# Patient Record
Sex: MALE | Race: OTHER | HISPANIC OR LATINO | Employment: OTHER | URBAN - METROPOLITAN AREA
[De-identification: names, ages, dates, MRNs, and addresses within clinical notes are randomized per-mention and may not be internally consistent; named-entity substitution may affect disease eponyms.]

---

## 2017-01-09 ENCOUNTER — GENERIC CONVERSION - ENCOUNTER (OUTPATIENT)
Dept: OTHER | Facility: OTHER | Age: 47
End: 2017-01-09

## 2017-01-30 ENCOUNTER — TRANSCRIBE ORDERS (OUTPATIENT)
Dept: ADMINISTRATIVE | Facility: HOSPITAL | Age: 47
End: 2017-01-30

## 2017-01-30 DIAGNOSIS — D49.6 CAVERNOUS SINUS TUMOR (HCC): Primary | ICD-10-CM

## 2017-02-03 ENCOUNTER — HOSPITAL ENCOUNTER (OUTPATIENT)
Dept: RADIOLOGY | Age: 47
Discharge: HOME | End: 2017-02-03
Payer: COMMERCIAL

## 2017-02-03 DIAGNOSIS — D49.6 CAVERNOUS SINUS TUMOR (HCC): ICD-10-CM

## 2017-02-03 PROCEDURE — 70486 CT MAXILLOFACIAL W/O DYE: CPT

## 2017-02-22 ENCOUNTER — OFFICE VISIT (OUTPATIENT)
Dept: URGENT CARE | Age: 47
End: 2017-02-22
Payer: COMMERCIAL

## 2017-02-22 PROCEDURE — 87430 STREP A AG IA: CPT | Performed by: FAMILY MEDICINE

## 2017-02-22 PROCEDURE — 99283 EMERGENCY DEPT VISIT LOW MDM: CPT | Performed by: FAMILY MEDICINE

## 2017-02-22 PROCEDURE — G0382 LEV 3 HOSP TYPE B ED VISIT: HCPCS | Performed by: FAMILY MEDICINE

## 2017-03-06 ENCOUNTER — ANESTHESIA EVENT (OUTPATIENT)
Dept: GASTROENTEROLOGY | Facility: HOSPITAL | Age: 47
End: 2017-03-06
Payer: COMMERCIAL

## 2017-03-07 ENCOUNTER — HOSPITAL ENCOUNTER (OUTPATIENT)
Facility: HOSPITAL | Age: 47
Setting detail: OUTPATIENT SURGERY
Discharge: HOME | End: 2017-03-07
Attending: COLON & RECTAL SURGERY | Admitting: COLON & RECTAL SURGERY
Payer: COMMERCIAL

## 2017-03-07 ENCOUNTER — ANESTHESIA (OUTPATIENT)
Dept: GASTROENTEROLOGY | Facility: HOSPITAL | Age: 47
End: 2017-03-07
Payer: COMMERCIAL

## 2017-03-07 VITALS
RESPIRATION RATE: 16 BRPM | SYSTOLIC BLOOD PRESSURE: 114 MMHG | OXYGEN SATURATION: 95 % | HEART RATE: 82 BPM | BODY MASS INDEX: 29.55 KG/M2 | HEIGHT: 68 IN | TEMPERATURE: 98.1 F | WEIGHT: 195 LBS | DIASTOLIC BLOOD PRESSURE: 72 MMHG

## 2017-03-07 DIAGNOSIS — K62.5 RECTAL BLEEDING: ICD-10-CM

## 2017-03-07 PROCEDURE — 88305 TISSUE EXAM BY PATHOLOGIST: CPT | Performed by: COLON & RECTAL SURGERY

## 2017-03-07 RX ORDER — PROPOFOL 10 MG/ML
INJECTION, EMULSION INTRAVENOUS AS NEEDED
Status: DISCONTINUED | OUTPATIENT
Start: 2017-03-07 | End: 2017-03-07 | Stop reason: SURG

## 2017-03-07 RX ORDER — SODIUM CHLORIDE, SODIUM LACTATE, POTASSIUM CHLORIDE, CALCIUM CHLORIDE 600; 310; 30; 20 MG/100ML; MG/100ML; MG/100ML; MG/100ML
50 INJECTION, SOLUTION INTRAVENOUS CONTINUOUS
Status: DISCONTINUED | OUTPATIENT
Start: 2017-03-07 | End: 2017-03-07 | Stop reason: HOSPADM

## 2017-03-07 RX ORDER — PROPOFOL 10 MG/ML
INJECTION, EMULSION INTRAVENOUS CONTINUOUS PRN
Status: DISCONTINUED | OUTPATIENT
Start: 2017-03-07 | End: 2017-03-07 | Stop reason: SURG

## 2017-03-07 RX ORDER — LIDOCAINE HYDROCHLORIDE 10 MG/ML
INJECTION, SOLUTION INFILTRATION; PERINEURAL AS NEEDED
Status: DISCONTINUED | OUTPATIENT
Start: 2017-03-07 | End: 2017-03-07 | Stop reason: SURG

## 2017-03-07 RX ORDER — SODIUM CHLORIDE 9 MG/ML
INJECTION, SOLUTION INTRAVENOUS CONTINUOUS PRN
Status: DISCONTINUED | OUTPATIENT
Start: 2017-03-07 | End: 2017-03-07 | Stop reason: SURG

## 2017-03-07 RX ADMIN — SODIUM CHLORIDE, SODIUM LACTATE, POTASSIUM CHLORIDE, AND CALCIUM CHLORIDE 50 ML/HR: .6; .31; .03; .02 INJECTION, SOLUTION INTRAVENOUS at 09:43

## 2017-03-07 RX ADMIN — LIDOCAINE HYDROCHLORIDE 50 MG: 10 INJECTION, SOLUTION INFILTRATION; PERINEURAL at 09:50

## 2017-03-07 RX ADMIN — PROPOFOL 150 MG: 10 INJECTION, EMULSION INTRAVENOUS at 09:50

## 2017-03-07 RX ADMIN — SODIUM CHLORIDE: 0.9 INJECTION, SOLUTION INTRAVENOUS at 09:35

## 2017-03-07 RX ADMIN — PROPOFOL 120 MCG/KG/MIN: 10 INJECTION, EMULSION INTRAVENOUS at 09:51

## 2017-04-24 ENCOUNTER — ALLSCRIPTS OFFICE VISIT (OUTPATIENT)
Dept: OTHER | Facility: OTHER | Age: 47
End: 2017-04-24

## 2017-04-24 DIAGNOSIS — C76.0 MALIGNANT NEOPLASM OF HEAD, FACE AND NECK (HCC): ICD-10-CM

## 2017-04-24 DIAGNOSIS — R63.5 ABNORMAL WEIGHT GAIN: ICD-10-CM

## 2017-04-24 DIAGNOSIS — Z13.220 ENCOUNTER FOR SCREENING FOR LIPOID DISORDERS: ICD-10-CM

## 2017-04-26 ENCOUNTER — TRANSCRIBE ORDERS (OUTPATIENT)
Dept: LAB | Facility: HOSPITAL | Age: 47
End: 2017-04-26

## 2017-04-26 ENCOUNTER — APPOINTMENT (OUTPATIENT)
Dept: LAB | Facility: HOSPITAL | Age: 47
End: 2017-04-26
Attending: OTOLARYNGOLOGY
Payer: COMMERCIAL

## 2017-04-26 DIAGNOSIS — J34.89 ATROPHY OF NASAL TURBINATES: ICD-10-CM

## 2017-04-26 DIAGNOSIS — J32.2 CHRONIC ETHMOIDAL SINUSITIS: Primary | ICD-10-CM

## 2017-04-26 DIAGNOSIS — J34.2 DEVIATED NASAL SEPTUM: ICD-10-CM

## 2017-04-26 DIAGNOSIS — J32.2 CHRONIC ETHMOIDAL SINUSITIS: ICD-10-CM

## 2017-04-26 LAB
ANION GAP SERPL CALCULATED.3IONS-SCNC: 7 MMOL/L (ref 4–13)
BASOPHILS # BLD AUTO: 0.04 THOUSANDS/ΜL (ref 0–0.1)
BASOPHILS NFR BLD AUTO: 0 % (ref 0–1)
BUN SERPL-MCNC: 15 MG/DL (ref 5–25)
CALCIUM SERPL-MCNC: 9 MG/DL (ref 8.3–10.1)
CHLORIDE SERPL-SCNC: 103 MMOL/L (ref 100–108)
CO2 SERPL-SCNC: 29 MMOL/L (ref 21–32)
CREAT SERPL-MCNC: 0.93 MG/DL (ref 0.6–1.3)
EOSINOPHIL # BLD AUTO: 0.07 THOUSAND/ΜL (ref 0–0.61)
EOSINOPHIL NFR BLD AUTO: 1 % (ref 0–6)
ERYTHROCYTE [DISTWIDTH] IN BLOOD BY AUTOMATED COUNT: 12.8 % (ref 11.6–15.1)
GFR SERPL CREATININE-BSD FRML MDRD: >60 ML/MIN/1.73SQ M
GLUCOSE SERPL-MCNC: 127 MG/DL (ref 65–140)
HCT VFR BLD AUTO: 45.8 % (ref 36.5–49.3)
HGB BLD-MCNC: 15.5 G/DL (ref 12–17)
LYMPHOCYTES # BLD AUTO: 1.48 THOUSANDS/ΜL (ref 0.6–4.47)
LYMPHOCYTES NFR BLD AUTO: 14 % (ref 14–44)
MCH RBC QN AUTO: 31.3 PG (ref 26.8–34.3)
MCHC RBC AUTO-ENTMCNC: 33.8 G/DL (ref 31.4–37.4)
MCV RBC AUTO: 92 FL (ref 82–98)
MONOCYTES # BLD AUTO: 0.7 THOUSAND/ΜL (ref 0.17–1.22)
MONOCYTES NFR BLD AUTO: 6 % (ref 4–12)
NEUTROPHILS # BLD AUTO: 8.6 THOUSANDS/ΜL (ref 1.85–7.62)
NEUTS SEG NFR BLD AUTO: 79 % (ref 43–75)
NRBC BLD AUTO-RTO: 0 /100 WBCS
PLATELET # BLD AUTO: 203 THOUSANDS/UL (ref 149–390)
PMV BLD AUTO: 12.4 FL (ref 8.9–12.7)
POTASSIUM SERPL-SCNC: 3.6 MMOL/L (ref 3.5–5.3)
RBC # BLD AUTO: 4.96 MILLION/UL (ref 3.88–5.62)
SODIUM SERPL-SCNC: 139 MMOL/L (ref 136–145)
WBC # BLD AUTO: 10.91 THOUSAND/UL (ref 4.31–10.16)

## 2017-04-26 PROCEDURE — 85025 COMPLETE CBC W/AUTO DIFF WBC: CPT

## 2017-04-26 PROCEDURE — 80048 BASIC METABOLIC PNL TOTAL CA: CPT

## 2017-04-26 PROCEDURE — 36415 COLL VENOUS BLD VENIPUNCTURE: CPT

## 2017-04-27 ENCOUNTER — ANESTHESIA EVENT (OUTPATIENT)
Dept: PERIOP | Facility: HOSPITAL | Age: 47
End: 2017-04-27
Payer: COMMERCIAL

## 2017-04-28 ENCOUNTER — HOSPITAL ENCOUNTER (OUTPATIENT)
Facility: HOSPITAL | Age: 47
Setting detail: OUTPATIENT SURGERY
Discharge: HOME | End: 2017-04-28
Attending: OTOLARYNGOLOGY | Admitting: OTOLARYNGOLOGY
Payer: COMMERCIAL

## 2017-04-28 ENCOUNTER — ANESTHESIA (OUTPATIENT)
Dept: PERIOP | Facility: HOSPITAL | Age: 47
End: 2017-04-28
Payer: COMMERCIAL

## 2017-04-28 VITALS
TEMPERATURE: 98.5 F | HEIGHT: 68 IN | WEIGHT: 203 LBS | OXYGEN SATURATION: 94 % | RESPIRATION RATE: 16 BRPM | BODY MASS INDEX: 30.77 KG/M2 | DIASTOLIC BLOOD PRESSURE: 72 MMHG | SYSTOLIC BLOOD PRESSURE: 124 MMHG | HEART RATE: 91 BPM

## 2017-04-28 DIAGNOSIS — J34.89 NASAL OBSTRUCTION: ICD-10-CM

## 2017-04-28 DIAGNOSIS — J32.9 CHRONIC SINUSITIS: ICD-10-CM

## 2017-04-28 DIAGNOSIS — R09.81 NASAL CONGESTION: ICD-10-CM

## 2017-04-28 DIAGNOSIS — J34.2 DEVIATED SEPTUM: ICD-10-CM

## 2017-04-28 DIAGNOSIS — J34.89 NASAL VALVE COLLAPSE: ICD-10-CM

## 2017-04-28 DIAGNOSIS — J34.3 NASAL TURBINATE HYPERTROPHY: ICD-10-CM

## 2017-04-28 PROCEDURE — C1726 CATH, BAL DIL, NON-VASCULAR: HCPCS | Performed by: OTOLARYNGOLOGY

## 2017-04-28 PROCEDURE — 88311 DECALCIFY TISSUE: CPT | Performed by: OTOLARYNGOLOGY

## 2017-04-28 PROCEDURE — C2625 STENT, NON-COR, TEM W/DEL SY: HCPCS | Performed by: OTOLARYNGOLOGY

## 2017-04-28 PROCEDURE — 88305 TISSUE EXAM BY PATHOLOGIST: CPT | Performed by: OTOLARYNGOLOGY

## 2017-04-28 DEVICE — PROPEL MINI SINUS IMPLANT
Type: IMPLANTABLE DEVICE | Site: NOSE | Status: FUNCTIONAL
Brand: PROPEL MINI

## 2017-04-28 RX ORDER — FENTANYL CITRATE 50 UG/ML
INJECTION, SOLUTION INTRAMUSCULAR; INTRAVENOUS AS NEEDED
Status: DISCONTINUED | OUTPATIENT
Start: 2017-04-28 | End: 2017-04-28 | Stop reason: SURG

## 2017-04-28 RX ORDER — ONDANSETRON 2 MG/ML
4 INJECTION INTRAMUSCULAR; INTRAVENOUS ONCE AS NEEDED
Status: DISCONTINUED | OUTPATIENT
Start: 2017-04-28 | End: 2017-04-28 | Stop reason: HOSPADM

## 2017-04-28 RX ORDER — SODIUM CHLORIDE, SODIUM LACTATE, POTASSIUM CHLORIDE, CALCIUM CHLORIDE 600; 310; 30; 20 MG/100ML; MG/100ML; MG/100ML; MG/100ML
125 INJECTION, SOLUTION INTRAVENOUS CONTINUOUS
Status: DISCONTINUED | OUTPATIENT
Start: 2017-04-28 | End: 2017-04-28 | Stop reason: HOSPADM

## 2017-04-28 RX ORDER — LIDOCAINE HYDROCHLORIDE AND EPINEPHRINE 10; 10 MG/ML; UG/ML
INJECTION, SOLUTION INFILTRATION; PERINEURAL AS NEEDED
Status: DISCONTINUED | OUTPATIENT
Start: 2017-04-28 | End: 2017-04-28 | Stop reason: HOSPADM

## 2017-04-28 RX ORDER — PROPOFOL 10 MG/ML
INJECTION, EMULSION INTRAVENOUS AS NEEDED
Status: DISCONTINUED | OUTPATIENT
Start: 2017-04-28 | End: 2017-04-28 | Stop reason: SURG

## 2017-04-28 RX ORDER — GLYCOPYRROLATE 0.2 MG/ML
INJECTION INTRAMUSCULAR; INTRAVENOUS AS NEEDED
Status: DISCONTINUED | OUTPATIENT
Start: 2017-04-28 | End: 2017-04-28 | Stop reason: SURG

## 2017-04-28 RX ORDER — LIDOCAINE HYDROCHLORIDE 40 MG/ML
SOLUTION TOPICAL AS NEEDED
Status: DISCONTINUED | OUTPATIENT
Start: 2017-04-28 | End: 2017-04-28 | Stop reason: SURG

## 2017-04-28 RX ORDER — LIDOCAINE HYDROCHLORIDE 10 MG/ML
INJECTION, SOLUTION INFILTRATION; PERINEURAL AS NEEDED
Status: DISCONTINUED | OUTPATIENT
Start: 2017-04-28 | End: 2017-04-28 | Stop reason: SURG

## 2017-04-28 RX ORDER — SODIUM CHLORIDE, SODIUM LACTATE, POTASSIUM CHLORIDE, CALCIUM CHLORIDE 600; 310; 30; 20 MG/100ML; MG/100ML; MG/100ML; MG/100ML
20 INJECTION, SOLUTION INTRAVENOUS CONTINUOUS
Status: DISCONTINUED | OUTPATIENT
Start: 2017-04-28 | End: 2017-04-28 | Stop reason: HOSPADM

## 2017-04-28 RX ORDER — OXYCODONE HYDROCHLORIDE AND ACETAMINOPHEN 5; 325 MG/1; MG/1
1 TABLET ORAL EVERY 4 HOURS PRN
Qty: 28 TABLET | Refills: 0 | Status: SHIPPED | OUTPATIENT
Start: 2017-04-28 | End: 2017-05-08

## 2017-04-28 RX ORDER — ONDANSETRON 2 MG/ML
INJECTION INTRAMUSCULAR; INTRAVENOUS AS NEEDED
Status: DISCONTINUED | OUTPATIENT
Start: 2017-04-28 | End: 2017-04-28 | Stop reason: SURG

## 2017-04-28 RX ORDER — CEPHALEXIN 500 MG/1
500 CAPSULE ORAL 4 TIMES DAILY
Qty: 40 CAPSULE | Refills: 0 | Status: SHIPPED | OUTPATIENT
Start: 2017-04-28 | End: 2017-05-08

## 2017-04-28 RX ORDER — MIDAZOLAM HYDROCHLORIDE 1 MG/ML
INJECTION INTRAMUSCULAR; INTRAVENOUS AS NEEDED
Status: DISCONTINUED | OUTPATIENT
Start: 2017-04-28 | End: 2017-04-28 | Stop reason: SURG

## 2017-04-28 RX ORDER — OXYCODONE HYDROCHLORIDE AND ACETAMINOPHEN 5; 325 MG/1; MG/1
2 TABLET ORAL EVERY 4 HOURS PRN
Status: DISCONTINUED | OUTPATIENT
Start: 2017-04-28 | End: 2017-04-28 | Stop reason: HOSPADM

## 2017-04-28 RX ORDER — FENTANYL CITRATE/PF 50 MCG/ML
25 SYRINGE (ML) INJECTION
Status: COMPLETED | OUTPATIENT
Start: 2017-04-28 | End: 2017-04-28

## 2017-04-28 RX ORDER — ROCURONIUM BROMIDE 10 MG/ML
INJECTION, SOLUTION INTRAVENOUS AS NEEDED
Status: DISCONTINUED | OUTPATIENT
Start: 2017-04-28 | End: 2017-04-28 | Stop reason: SURG

## 2017-04-28 RX ORDER — ONDANSETRON 2 MG/ML
4 INJECTION INTRAMUSCULAR; INTRAVENOUS EVERY 6 HOURS PRN
Status: DISCONTINUED | OUTPATIENT
Start: 2017-04-28 | End: 2017-04-28 | Stop reason: HOSPADM

## 2017-04-28 RX ADMIN — HYDROMORPHONE HYDROCHLORIDE 0.5 MG: 1 INJECTION, SOLUTION INTRAMUSCULAR; INTRAVENOUS; SUBCUTANEOUS at 13:36

## 2017-04-28 RX ADMIN — FENTANYL CITRATE 25 MCG: 50 INJECTION INTRAMUSCULAR; INTRAVENOUS at 13:07

## 2017-04-28 RX ADMIN — LIDOCAINE HYDROCHLORIDE 50 MG: 10 INJECTION, SOLUTION INFILTRATION; PERINEURAL at 11:10

## 2017-04-28 RX ADMIN — SODIUM CHLORIDE, SODIUM LACTATE, POTASSIUM CHLORIDE, AND CALCIUM CHLORIDE: .6; .31; .03; .02 INJECTION, SOLUTION INTRAVENOUS at 10:45

## 2017-04-28 RX ADMIN — ONDANSETRON 4 MG: 2 INJECTION INTRAMUSCULAR; INTRAVENOUS at 12:15

## 2017-04-28 RX ADMIN — FENTANYL CITRATE 25 MCG: 50 INJECTION INTRAMUSCULAR; INTRAVENOUS at 13:03

## 2017-04-28 RX ADMIN — FENTANYL CITRATE 50 MCG: 50 INJECTION, SOLUTION INTRAMUSCULAR; INTRAVENOUS at 12:15

## 2017-04-28 RX ADMIN — NEOSTIGMINE METHYLSULFATE 2.5 MG: 1 INJECTION INTRAMUSCULAR; INTRAVENOUS; SUBCUTANEOUS at 12:35

## 2017-04-28 RX ADMIN — FENTANYL CITRATE 50 MCG: 50 INJECTION, SOLUTION INTRAMUSCULAR; INTRAVENOUS at 11:10

## 2017-04-28 RX ADMIN — Medication 2000 MG: at 11:10

## 2017-04-28 RX ADMIN — MIDAZOLAM HYDROCHLORIDE 2 MG: 1 INJECTION, SOLUTION INTRAMUSCULAR; INTRAVENOUS at 10:55

## 2017-04-28 RX ADMIN — FENTANYL CITRATE 25 MCG: 50 INJECTION INTRAMUSCULAR; INTRAVENOUS at 13:11

## 2017-04-28 RX ADMIN — GLYCOPYRROLATE 0.4 MG: 0.2 INJECTION INTRAMUSCULAR; INTRAVENOUS at 12:35

## 2017-04-28 RX ADMIN — PROPOFOL 200 MG: 10 INJECTION, EMULSION INTRAVENOUS at 11:10

## 2017-04-28 RX ADMIN — HYDROMORPHONE HYDROCHLORIDE 0.5 MG: 1 INJECTION, SOLUTION INTRAMUSCULAR; INTRAVENOUS; SUBCUTANEOUS at 13:26

## 2017-04-28 RX ADMIN — ROCURONIUM BROMIDE 40 MG: 10 INJECTION, SOLUTION INTRAVENOUS at 11:10

## 2017-04-28 RX ADMIN — SODIUM CHLORIDE, SODIUM LACTATE, POTASSIUM CHLORIDE, AND CALCIUM CHLORIDE 125 ML/HR: .6; .31; .03; .02 INJECTION, SOLUTION INTRAVENOUS at 10:02

## 2017-04-28 RX ADMIN — LIDOCAINE HYDROCHLORIDE 1 APPLICATION: 40 SOLUTION TOPICAL at 11:13

## 2017-04-28 RX ADMIN — FENTANYL CITRATE 25 MCG: 50 INJECTION INTRAMUSCULAR; INTRAVENOUS at 13:13

## 2017-04-28 RX ADMIN — SODIUM CHLORIDE, SODIUM LACTATE, POTASSIUM CHLORIDE, AND CALCIUM CHLORIDE 125 ML/HR: .6; .31; .03; .02 INJECTION, SOLUTION INTRAVENOUS at 13:51

## 2017-05-05 ENCOUNTER — TRANSCRIBE ORDERS (OUTPATIENT)
Dept: ADMINISTRATIVE | Facility: HOSPITAL | Age: 47
End: 2017-05-05

## 2017-05-05 DIAGNOSIS — C76.0 MALIGNANT NEOPLASM OF HEAD, FACE, AND NECK (HCC): Primary | ICD-10-CM

## 2017-05-18 ENCOUNTER — APPOINTMENT (OUTPATIENT)
Dept: LAB | Facility: HOSPITAL | Age: 47
End: 2017-05-18
Attending: FAMILY MEDICINE
Payer: COMMERCIAL

## 2017-05-18 ENCOUNTER — TRANSCRIBE ORDERS (OUTPATIENT)
Dept: LAB | Facility: HOSPITAL | Age: 47
End: 2017-05-18

## 2017-05-18 DIAGNOSIS — R63.5 ABNORMAL WEIGHT GAIN: ICD-10-CM

## 2017-05-18 LAB
ALBUMIN SERPL BCP-MCNC: 3.8 G/DL (ref 3.5–5)
ALP SERPL-CCNC: 75 U/L (ref 46–116)
ALT SERPL W P-5'-P-CCNC: 46 U/L (ref 12–78)
ANION GAP SERPL CALCULATED.3IONS-SCNC: 6 MMOL/L (ref 4–13)
AST SERPL W P-5'-P-CCNC: 17 U/L (ref 5–45)
BILIRUB SERPL-MCNC: 0.86 MG/DL (ref 0.2–1)
BUN SERPL-MCNC: 17 MG/DL (ref 5–25)
CALCIUM SERPL-MCNC: 9.3 MG/DL (ref 8.3–10.1)
CHLORIDE SERPL-SCNC: 104 MMOL/L (ref 100–108)
CO2 SERPL-SCNC: 30 MMOL/L (ref 21–32)
CREAT SERPL-MCNC: 0.84 MG/DL (ref 0.6–1.3)
GFR SERPL CREATININE-BSD FRML MDRD: >60 ML/MIN/1.73SQ M
GLUCOSE SERPL-MCNC: 77 MG/DL (ref 65–140)
POTASSIUM SERPL-SCNC: 4 MMOL/L (ref 3.5–5.3)
PROT SERPL-MCNC: 7.9 G/DL (ref 6.4–8.2)
SODIUM SERPL-SCNC: 140 MMOL/L (ref 136–145)
TSH SERPL DL<=0.05 MIU/L-ACNC: 0.99 UIU/ML (ref 0.36–3.74)

## 2017-05-18 PROCEDURE — 84443 ASSAY THYROID STIM HORMONE: CPT

## 2017-05-18 PROCEDURE — 36415 COLL VENOUS BLD VENIPUNCTURE: CPT

## 2017-05-18 PROCEDURE — 80053 COMPREHEN METABOLIC PANEL: CPT

## 2017-05-22 ENCOUNTER — ALLSCRIPTS OFFICE VISIT (OUTPATIENT)
Dept: OTHER | Facility: OTHER | Age: 47
End: 2017-05-22

## 2017-07-24 ENCOUNTER — GENERIC CONVERSION - ENCOUNTER (OUTPATIENT)
Dept: OTHER | Facility: OTHER | Age: 47
End: 2017-07-24

## 2017-10-19 ENCOUNTER — ALLSCRIPTS OFFICE VISIT (OUTPATIENT)
Dept: OTHER | Facility: OTHER | Age: 47
End: 2017-10-19

## 2017-10-19 ENCOUNTER — TRANSCRIBE ORDERS (OUTPATIENT)
Dept: ADMINISTRATIVE | Facility: HOSPITAL | Age: 47
End: 2017-10-19

## 2017-10-19 DIAGNOSIS — C76.0 MALIGNANT NEOPLASM OF HEAD, FACE, AND NECK (HCC): Primary | ICD-10-CM

## 2017-10-20 NOTE — PROGRESS NOTES
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; Ordered By:Ally Richardson;    Discussion/Summary  Discussion Summary:   In summary, this is a 61-year-old male history of adenoid cystic carcinoma as outlined  he is doing fairly well  He does have some small volume nose bleeding from the right nostril on an almost daily basis  Saline nasal spray was recommended  he wakes up at night 4 times a night or so to clear his nasal passages  As a result he has daytime fatigue  I suggested that he confer with ENT regarding anything to minimize nighttime waking  This would probably translate into better daytime energy  his wife asked about whether his chemotherapy administration at the time that she became pregnant with their 3rd child could have any impact on him, specifically regarding the possibility of autism   We reviewed that this is difficult to prove but certainly is a consideration  made arrangements for repeat imaging just prior to his next visit in 4 months time  patient and his wife voiced understanding above discussion  Counseling Documentation With Imm: The patient was counseled regarding diagnostic results,-- instructions for management,-- patient and family education,-- impressions  total time of encounter was 25 minutes  Chief Complaint  Chief Complaint Free Text Note Form: Follow-up regarding adenoid cystic carcinoma  History of Present Illness  HPI: July 2015- 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, status post resection  This was followed by radiation with high-dose cisplatin  No postradiation chemotherapy was administered  He had good clinical/radiographic response  2016- He underwent debulking surgery  No recurrent disease was noted  Interval History: BL ankle swelling  tired and down  Review of Systems  Complete-Male:   Constitutional: No fever or chills, feels well, no tiredness, no recent weight gain or weight loss  Eyes: No complaints of eye pain, no red eyes, no discharge from eyes, no itchy eyes  The patient presents with complaints of episodes of bilateral nostril nosebleeds Episodes have been occurring 1 time a day starting about 2-3 months ago (mostly in the morning  )  Cardiovascular: No complaints of slow heart rate, no fast heart rate, no chest pain, no palpitations, no leg claudication, no lower extremity  Respiratory: No complaints of shortness of breath, no wheezing, no cough, no SOB on exertion, no orthopnea or PND  Gastrointestinal: No complaints of abdominal pain, no constipation, no nausea or vomiting, no diarrhea or bloody stools  Genitourinary: No complaints of dysuria, no incontinence, no hesitancy, no nocturia, no genital lesion, no testicular pain  Musculoskeletal: No complaints of arthralgia, no myalgias, no joint swelling or stiffness, no limb pain or swelling     Integumentary: migratory rash over past few months  The patient presents with complaints of intermittent episodes of dizziness (associated with headache  )  Psychiatric: Is not suicidal, no sleep disturbances, no anxiety or depression, no change in personality, no emotional problems  Endocrine: No complaints of proptosis, no hot flashes, no muscle weakness, no erectile dysfunction, no deepening of the voice, no feelings of weakness  Hematologic/Lymphatic: No complaints of swollen glands, no swollen glands in the neck, does not bleed easily, no easy bruising  Active Problems  1  Abnormal weight gain (783 1) (R63 5)   2  Acute streptococcal pharyngitis (034 0) (J02 0)   3  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)   4  Ear pain, right (388 70) (H92 01)   5  Hematochezia (578 1) (K92 1)   6  Screening for lipoid disorders (V77 91) (Z13 220)   7  Sore throat (462) (J02 9)    Family History  Father    1  Family history of lung cancer (V16 1) (Z80 1)   2  Family history of malignant neoplasm of breast (V16 3) (Z80 3)  Paternal Aunt    3  Family history of malignant neoplasm of breast (V16 3) (Z80 3)    Social History   · Never a smoker   · Social alcohol use (Z78 9)    Current Meds   1  Deep Sea Nasal [place] SOLN;   Therapy: (Recorded:[id number]) to Recorded    Allergies  1  Clindamycin    Vitals  Vital Signs    Recorded: [id number] 12:14PM   Temperature 97 8 F   Heart Rate 84   Respiration 15   Systolic 199   Diastolic 84   Height 5 ft 5 in   Weight 206 lb    BMI Calculated 34 28   BSA Calculated 2   Pain Scale 0     Physical Exam    Constitutional   General appearance: No acute distress, well appearing and well nourished  Eyes   Conjunctiva and lids: No swelling, erythema, or discharge  Pupils and irises: Equal, round and reactive to light  Ears, Nose, Mouth, and Throat   External inspection of ears and nose: Normal     Oropharynx: Normal with no erythema, edema, exudate or lesions      Pulmonary   Respiratory effort: No increased work of breathing or signs of respiratory distress  Auscultation of lungs: Clear to auscultation, equal breath sounds bilaterally, no wheezes, no rales, no rhonci  Cardiovascular   Auscultation of heart: Normal rate and rhythm, normal S1 and S2, without murmurs  Examination of extremities for edema and/or varicosities: Normal     Abdomen   Abdomen: Non-tender, no masses  Liver and spleen: No hepatomegaly or splenomegaly  Lymphatic   Palpation of lymph nodes in neck: No lymphadenopathy  Musculoskeletal   Gait and station: Normal     Skin   Skin and subcutaneous tissue: Normal without rashes or lesions  Neurologic   Cranial nerves: Cranial nerves 2-12 intact  Psychiatric   Orientation to person, place and time: Normal          Signatures   Electronically signed by : RAYMUNDO Toussaint  Oct 19 2017 12:51PM EST                       (Author)

## 2017-11-15 ENCOUNTER — APPOINTMENT (EMERGENCY)
Dept: RADIOLOGY | Facility: HOSPITAL | Age: 47
End: 2017-11-15
Payer: COMMERCIAL

## 2017-11-15 ENCOUNTER — HOSPITAL ENCOUNTER (EMERGENCY)
Facility: HOSPITAL | Age: 47
Discharge: HOME | End: 2017-11-15
Attending: EMERGENCY MEDICINE | Admitting: EMERGENCY MEDICINE
Payer: COMMERCIAL

## 2017-11-15 VITALS
RESPIRATION RATE: 20 BRPM | HEIGHT: 67 IN | TEMPERATURE: 96.9 F | OXYGEN SATURATION: 98 % | DIASTOLIC BLOOD PRESSURE: 95 MMHG | HEART RATE: 81 BPM | WEIGHT: 195 LBS | SYSTOLIC BLOOD PRESSURE: 136 MMHG | BODY MASS INDEX: 30.61 KG/M2

## 2017-11-15 DIAGNOSIS — J06.9 UPPER RESPIRATORY INFECTION: Primary | ICD-10-CM

## 2017-11-15 LAB
ALBUMIN SERPL BCP-MCNC: 3.6 G/DL (ref 3.5–5)
ALP SERPL-CCNC: 77 U/L (ref 46–116)
ALT SERPL W P-5'-P-CCNC: 47 U/L (ref 12–78)
ANION GAP SERPL CALCULATED.3IONS-SCNC: 7 MMOL/L (ref 4–13)
AST SERPL W P-5'-P-CCNC: 21 U/L (ref 5–45)
BASOPHILS # BLD AUTO: 0.04 THOUSANDS/ΜL (ref 0–0.1)
BASOPHILS NFR BLD AUTO: 1 % (ref 0–1)
BILIRUB SERPL-MCNC: 0.69 MG/DL (ref 0.2–1)
BUN SERPL-MCNC: 15 MG/DL (ref 5–25)
CALCIUM SERPL-MCNC: 9 MG/DL (ref 8.3–10.1)
CHLORIDE SERPL-SCNC: 106 MMOL/L (ref 100–108)
CO2 SERPL-SCNC: 25 MMOL/L (ref 21–32)
CREAT SERPL-MCNC: 0.82 MG/DL (ref 0.6–1.3)
EOSINOPHIL # BLD AUTO: 0.36 THOUSAND/ΜL (ref 0–0.61)
EOSINOPHIL NFR BLD AUTO: 4 % (ref 0–6)
ERYTHROCYTE [DISTWIDTH] IN BLOOD BY AUTOMATED COUNT: 12.5 % (ref 11.6–15.1)
GFR SERPL CREATININE-BSD FRML MDRD: 107 ML/MIN/1.73SQ M
GLUCOSE SERPL-MCNC: 80 MG/DL (ref 65–140)
HCT VFR BLD AUTO: 44.7 % (ref 36.5–49.3)
HGB BLD-MCNC: 15.2 G/DL (ref 12–17)
LIPASE SERPL-CCNC: 104 U/L (ref 73–393)
LYMPHOCYTES # BLD AUTO: 1.5 THOUSANDS/ΜL (ref 0.6–4.47)
LYMPHOCYTES NFR BLD AUTO: 18 % (ref 14–44)
MCH RBC QN AUTO: 30.5 PG (ref 26.8–34.3)
MCHC RBC AUTO-ENTMCNC: 34 G/DL (ref 31.4–37.4)
MCV RBC AUTO: 90 FL (ref 82–98)
MONOCYTES # BLD AUTO: 0.53 THOUSAND/ΜL (ref 0.17–1.22)
MONOCYTES NFR BLD AUTO: 6 % (ref 4–12)
NEUTROPHILS # BLD AUTO: 5.78 THOUSANDS/ΜL (ref 1.85–7.62)
NEUTS SEG NFR BLD AUTO: 71 % (ref 43–75)
NRBC BLD AUTO-RTO: 0 /100 WBCS
PLATELET # BLD AUTO: 222 THOUSANDS/UL (ref 149–390)
PMV BLD AUTO: 12 FL (ref 8.9–12.7)
POTASSIUM SERPL-SCNC: 4.1 MMOL/L (ref 3.5–5.3)
PROT SERPL-MCNC: 7.8 G/DL (ref 6.4–8.2)
RBC # BLD AUTO: 4.99 MILLION/UL (ref 3.88–5.62)
S PYO AG THROAT QL: NEGATIVE
SODIUM SERPL-SCNC: 138 MMOL/L (ref 136–145)
WBC # BLD AUTO: 8.22 THOUSAND/UL (ref 4.31–10.16)

## 2017-11-15 PROCEDURE — 87147 CULTURE TYPE IMMUNOLOGIC: CPT | Performed by: EMERGENCY MEDICINE

## 2017-11-15 PROCEDURE — 87430 STREP A AG IA: CPT | Performed by: EMERGENCY MEDICINE

## 2017-11-15 PROCEDURE — 87070 CULTURE OTHR SPECIMN AEROBIC: CPT | Performed by: EMERGENCY MEDICINE

## 2017-11-15 PROCEDURE — 71020 HB CHEST X-RAY 2VW FRONTAL&LATL: CPT

## 2017-11-15 PROCEDURE — 99283 EMERGENCY DEPT VISIT LOW MDM: CPT

## 2017-11-15 PROCEDURE — 83690 ASSAY OF LIPASE: CPT | Performed by: EMERGENCY MEDICINE

## 2017-11-15 PROCEDURE — 36415 COLL VENOUS BLD VENIPUNCTURE: CPT | Performed by: EMERGENCY MEDICINE

## 2017-11-15 PROCEDURE — 80053 COMPREHEN METABOLIC PANEL: CPT | Performed by: EMERGENCY MEDICINE

## 2017-11-15 PROCEDURE — 85025 COMPLETE CBC W/AUTO DIFF WBC: CPT | Performed by: EMERGENCY MEDICINE

## 2017-11-15 RX ORDER — ONDANSETRON 4 MG/1
4 TABLET, ORALLY DISINTEGRATING ORAL ONCE
Status: COMPLETED | OUTPATIENT
Start: 2017-11-15 | End: 2017-11-15

## 2017-11-15 RX ORDER — MOMETASONE FUROATE 50 UG/1
2 SPRAY, METERED NASAL DAILY
Qty: 17 G | Refills: 0 | Status: SHIPPED | OUTPATIENT
Start: 2017-11-15

## 2017-11-15 RX ADMIN — ONDANSETRON 4 MG: 4 TABLET, ORALLY DISINTEGRATING ORAL at 11:59

## 2017-11-15 NOTE — DISCHARGE INSTRUCTIONS
Infección respiratoria superior   LO QUE NECESITA SABER:   Paola infección respiratoria superior también se conoce kobe el resfriado común  Chela es paola infección que puede afectar couch nariz, garganta, oídos y los senos frontales  Para personas saludables, el resfriado común generalmente no es grave y no requiere tratamiento especial  Los síntomas del resfriado generalmente son Renaldo Best graves tamika los primeros 3 a 5 días  [place] 121 en 7 a 14 días  Puede que usted siga tosiendo por 2 a 3 semanas  Los resfriados son provocados por virus y no mejoran con antibióticos  INSTRUCCIONES SOBRE EL LEILA HOSPITALARIA:   Busque atención médica de inmediato si:   · Usted tiene dolor torácico y dificultad para respirar  Pregúntele a couch Severiano Nones vitaminas y minerales son adecuados para usted  · Usted tiene fiebre de más de 102ºF [facility])  · Couch garganta irritada empeora o usted ve manchas tee o ibeth en couch garganta  · Alycia síntomas empeoran después de 3 a 5 días o couch resfriado no mejora en 14 días  · Usted tiene sarpullido en alguna parte de couch piel  · Usted tiene bultos grandes y sensible en couch [place]    · Usted tiene secreción espesa de color debbie o amarillo proveniente de couch nariz  · Usted expectora mucosidad de color amarillo, debbie o con Anna  · Usted vomita por más de 24 horas y no puede retener líquidos en el estómago  · Usted tiene un grave dolor de oído  · Usted tiene preguntas o inquietudes acerca de couch condición o cuidado  Medicamentos:  Es posible que usted necesite alguno de los siguientes:  · Los descongestionantes  ayudan a reducir la congestión nasal y Mandy Likes a respirar más fácilmente  Si lisha pastillas descongestionantes, pueden causarle agitación o problemas para dormir  No use aerosol descongestionante por más de unos cuantos días  · Los jarabes para la tos  ayudan a reducir la tos   Pregúntele a couch médico cuál tipo de medicamento para la tos es mejor para usted  · AINEs (Analgésicos antiinflamatorios no esteroides) kobe el ibuprofeno, ayudan a disminuir la inflamación, el dolor y la [place]  Los AINEs pueden causar sangrado estomacal o problemas renales en ciertas personas  Si usted lisha un medicamento anticoagulante, siempre pregúntele a couch médico si los ARTHUR son seguros para usted  Siempre oanh la etiqueta de tani medicamento y [place] instrucciones  · Acetaminofeno:  rosy el dolor y baja la fiebre  Está disponible sin receta médica  Pregunte la cantidad y la frecuencia con que debe tomarlos  [address]  Oanh las etiquetas de todos los demás medicamentos que esté usando para saber si también contienen acetaminofén, o pregunte a couch médico o farmacéutico  El acetaminofén puede causar daño en el hígado cuando no se lisha de forma correcta  No use más de 4 gramos (4000 miligramos) en total de acetaminofeno en un día  · [place] ashwin medicamentos kobe se le haya indicado  Consulte con couch médico si usted darren que couch medicamento no le está ayudando o si presenta efectos secundarios  Infórmele si es alérgico a cualquier medicamento  Mantenga freddie lista actualizada de los [employer], las vitaminas y los productos herbales que lisha  Incluya los siguientes datos de los medicamentos: cantidad, frecuencia y motivo de administración  Traiga con usted la lista o los envases de la píldoras a ashwin citas de seguimiento  Lleve la lista de los medicamentos con usted en steve de freddie emergencia  Acuda a ashwin consultas de control con couch médico según le indicaron  Anote ashwin preguntas para que se acuerde de hacerlas tamika ashwin visitas  Cuidados personales:   · Descanse el mayor tiempo posible  Empiece a hacer un poco más día a día  · [employer] líquidos kobe se le indique  Los líquidos le ayudarán a aflojar y disolver la mucosidad para que la pueda expectorar  Los líquidos ayudarán a evitar la deshidratación   Los líquidos que ayudan a prevenir la deshidratación pueden ser Honey Chavez, jugo de fruta y caldo  No tome líquidos que contienen cafeína  La cafeína puede aumentar el riesgo de deshidratación  Pregúntele a couch médico cuál es la cantidad de líquido que necesita ingerir a diario  · Alivie el dolor de garganta  Winnie gárgaras de agua tibia con sal  Jenner ayuda a aliviar el dolor de garganta  Prepare agua salina disolviendo ¼ de cucharada de sal a 1 taza de agua tibia  Usted puede también chupar dulces duros o pastillas para la garganta  Usted puede usar aerosol para el dolor de garganta  · Use un humidificador o vaporizador  Use un humidificador de ubaldo frío o un vaporizador para elevar la humedad en couch casa  Jenner podría ayudarle a respirar más fácilmente y al mismo tiempo disminuir la tos  · Use gotas nasales de solución salina kobe se le haya indicado  Estas ayudan a [place]  · Aplique vaselina en la parte externa alrededor de las fosas nasales  Esta puede disminuir la irritación de sonarse la nariz  · No fume  La nicotina y otros químicos en los cigarrillos y cigarros pueden empeorar ashwin síntomas  También pueden causar infecciones kobe la bronquitis o la neumonía  Pida información a couch médico si usted actualmente fuma y necesita ayuda para dejar de fumar  Los cigarrillos electrónicos o tabaco sin humo todavía contienen nicotina  Consulte con couch médico antes de [employer]  Evite transmitir couch resfriado a los demás:   · Trate de mantenerse alejado de otras personas tamika los primeros 2 a 3 días de couch resfriado cuando éste es más fácil de transmitir  · No comparta alimentos o bebidas  · No comparta toallas de mano con otros miembros de sam en el hogar  · [employer] frecuentemente, especialmente después de sonarse la nariz  [place] la espalda a otras personas y cúbrase la boca y la nariz con un pañuelo desechable cuando estornuda o tose         © 2017 [address] Information is for End User's use only and may not be sold, redistributed or otherwise used for commercial purposes  All illustrations and images included in CareNotes® are the copyrighted property of A D A M , Inc  or Meliton Navarrete  Esta información es sólo para uso en educación  Couch intención no es darle un consejo médico sobre enfermedades o tratamientos  Colsulte con couch Iris Contreras farmacéutico antes de seguir cualquier régimen médico para saber si es seguro y efectivo para usted

## 2017-11-15 NOTE — ED ATTENDING ATTESTATION
Skylar Palm DO, saw and evaluated the patient  I have discussed the patient with the resident/non-physician practitioner and agree with the resident's/non-physician practitioner's findings, Plan of Care, and MDM as documented in the resident's/non-physician practitioner's note, except where noted  All available labs and Radiology studies were reviewed  At this point I agree with the current assessment done in the Emergency Department  I have conducted an independent evaluation of this patient a history and physical is as follows:    41 yom with sore throat, N/Vx2  He states that he has had  A productive cough and at night he has small specs of blood in  his mucous  +h/o adenocarcinoma  The patient specifically requests that he wanted to be evaluated to see if he  Needed antibiotics  He follows closely was oncologist and is still on chemotherapy  /84   Pulse 90   Temp (!) 96 9 °F (36 1 °C) (Tympanic) Comment: Simultaneous filing  User may not have seen previous data  Comment (Src): Simultaneous filing  User may not have seen previous data  Resp 20   Ht 5' 7" (1 702 m)   Wt 88 5 kg (195 lb)   SpO2 96%   BMI 30 54 kg/m²     Patient looks well   pharynx appears mildly  Erythematous but nonedematous  Without swelling   lungs are clear   heart is regular   abdomen is benign   extremities unremarkable      Will check a chest x-ray to rule out infiltrate or obvious masses that could be a source of from offices  Will check a strep test      CBC unremarkable for any leukopenia and neutropenia  Electrolytes unremarkable  Results for Elena Duarte (MRN [id number]) as of 11/15/2017 12:45   Ref   Range 11/15/2017 12:04   eGFR Latest Units: ml/min/1 73sq m 107   Sodium Latest Ref Range: 136 - 145 mmol/L 138   Potassium Latest Ref Range: 3 5 - 5 3 mmol/L 4 1   Chloride Latest Ref Range: 100 - 108 mmol/L 106   CO2 Latest Ref Range: 21 - 32 mmol/L 25   Anion Gap Latest Ref Range: 4 - 13 mmol/L 7 BUN Latest Ref Range: 5 - 25 mg/dL 15   Creatinine Latest Ref Range: 0 60 - 1 30 mg/dL 0 82   Glucose Latest Ref Range: 65 - 140 mg/dL 80   Calcium Latest Ref Range: 8 3 - 10 1 mg/dL 9 0   AST Latest Ref Range: 5 - 45 U/L 21   ALT Latest Ref Range: 12 - 78 U/L 47   Alkaline Phosphatase Latest Ref Range: 46 - 116 U/L 77   Total Protein Latest Ref Range: 6 4 - 8 2 g/dL 7 8   Albumin Latest Ref Range: 3 5 - 5 0 g/dL 3 6   Total Bilirubin Latest Ref Range: 0 20 - 1 00 mg/dL 0 69   Lipase Latest Ref Range: 73 - 393 u/L 104   WBC Latest Ref Range: 4 31 - 10 16 Thousand/uL 8 22   RBC Latest Ref Range: 3 88 - 5 62 Million/uL 4 99   Hemoglobin Latest Ref Range: 12 0 - 17 0 g/dL 15 2   Hematocrit Latest Ref Range: 36 5 - 49 3 % 44 7   MCV Latest Ref Range: 82 - 98 fL 90   MCH Latest Ref Range: 26 8 - 34 3 pg 30 5   MCHC Latest Ref Range: 31 4 - 37 4 g/dL 34 0   RDW Latest Ref Range: 11 6 - 15 1 % 12 5   Platelets Latest Ref Range: 149 - 390 Thousands/uL 222   MPV Latest Ref Range: 8 9 - 12 7 fL 12 0   nRBC Latest Units: /100 WBCs 0   Neutrophils Relative Latest Ref Range: 43 - 75 % 71   Lymphocytes Relative Latest Ref Range: 14 - 44 % 18   Monocytes Relative Latest Ref Range: 4 - 12 % 6   Eosinophils Relative Latest Ref Range: 0 - 6 % 4   Basophils Relative Latest Ref Range: 0 - 1 % 1   Neutrophils Absolute Latest Ref Range: 1 85 - 7 62 Thousands/µL 5 78   Lymphocytes Absolute Latest Ref Range: 0 60 - 4 47 Thousands/µL 1 50   Monocytes Absolute Latest Ref Range: 0 17 - 1 22 Thousand/µL 0 53   Eosinophils Absolute Latest Ref Range: 0 00 - 0 61 Thousand/µL 0 36   Basophils Absolute Latest Ref Range: 0 00 - 0 10 Thousands/µL 0 04          diagnosis   pharyngitis          Critical Care Time  CritCare Time

## 2017-11-15 NOTE — ED PROVIDER NOTES
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 the emergency department for evaluation of a cough and hemoptysis  Patient states that he has been feeling as if he is developing a head cold over the past 3 days and over the past 24 hours developed a productive cough with scant amounts of blood  Patient also states that he has had a history of  nosebleeds over the past month, however, has not had a nose bleed in the past few days  Patient also complains of congestion with mild rhinorrhea  Patient also states that he has a sore throat each morning as cough is exacerbated with lying down  Patient also complains of mild nausea with no vomiting  Patient denies fever, chills, shortness of breath, chest pain, back pain, hematuria, hematochezia, diarrhea, hematemesis  History provided by:  Patient  Sore Throat   Associated symptoms: cough, epistaxis, postnasal drip and rhinorrhea    Associated symptoms: no abdominal pain, no chest pain, no chills, no ear discharge, no fever, no headaches, no neck stiffness, no rash, no shortness of breath, no stridor and no trouble swallowing        None       Past Medical History:   Diagnosis Date    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, sinus and right eye       Past Surgical History:   Procedure Laterality Date    WI COLONOSCOPY FLX DX W/COLLJ SPEC WHEN PFRMD N/A 3/7/2017    Procedure: COLONOSCOPY;  Surgeon: Dimitri Vargas MD;  Location: [facility]; Service: Colorectal    WI NASAL/SINUS ENDOSCOPY,RMV TISS MAXILL SINUS N/A 4/28/2017    Procedure: FUNCTIONAL ENDOSCOPIC SINUS SURGERY-IMAGE GUIDED; Frontal BALLOONoplasty; Anterior ethmoidectomy - right; Endoscopic lysis adhesions; SEPTOPLASTY;  Left frontal sinus ballon; Left inferior turbinoplasty;  Surgeon: Rocael Ta MD;  Location: [facility]; Service: ENT    TUMOR EXCISION      Excision from right nasal passage with plastic surgery to apply temporary piece for future nasal surgery  History reviewed  No pertinent family history  I have reviewed and agree with the history as documented  Social History   Substance Use Topics    Smoking status: Never Smoker    Smokeless tobacco: Never Used    Alcohol use No        Review of Systems   Constitutional: Negative for chills, diaphoresis, fatigue and fever  HENT: Positive for congestion, nosebleeds, postnasal drip, rhinorrhea and sore throat  Negative for ear discharge, facial swelling, hearing loss, sinus pain, sinus pressure, sneezing, tinnitus and trouble swallowing  Respiratory: Positive for cough  Negative for choking, chest tightness, shortness of breath, wheezing and stridor  Cardiovascular: Negative for chest pain, palpitations and leg swelling  Gastrointestinal: Negative for abdominal distention, abdominal pain, blood in stool, constipation, diarrhea, nausea and vomiting  Genitourinary: Negative for difficulty urinating, dysuria, enuresis, flank pain, frequency and hematuria  Musculoskeletal: Negative for arthralgias, back pain, gait problem and neck stiffness  Skin: Negative for rash and wound  Neurological: Negative for dizziness, seizures, syncope, weakness, numbness and headaches  Psychiatric/Behavioral: Negative for agitation, confusion, hallucinations, sleep disturbance and suicidal ideas         Physical Exam  ED Triage Vitals [11/15/17 1057]   Temperature Pulse Respirations Blood Pressure SpO2   (!) 96 9 °F (36 1 °C) 94 18 145/88 96 %      Temp Source Heart Rate Source Patient Position - Orthostatic VS BP Location FiO2 (%)   Tympanic Monitor Sitting Left arm --      Pain Score       8           Orthostatic Vital Signs  Vitals:    11/15/17 1057 11/15/17 1145   BP: 145/88 133/84   Pulse: 94 90   Patient Position - Orthostatic VS: Sitting Lying       Physical Exam Constitutional: He is oriented to person, place, and time  He appears well-developed and well-nourished  No distress  HENT:   Head: Normocephalic and atraumatic  Nose:       Mouth/Throat: Mucous membranes are normal        Eyes: EOM are normal    Neck: Normal range of motion  Cardiovascular: Normal rate and regular rhythm  Exam reveals no gallop and no friction rub  No murmur heard  Pulmonary/Chest: Breath sounds normal  No respiratory distress  He has no wheezes  He has no rales  Abdominal: Soft  Normal appearance and bowel sounds are normal  There is no tenderness  There is no rigidity, no rebound, no guarding, no CVA tenderness, no tenderness at McBurney's point and negative Agarwal's sign  Neurological: He is alert and oriented to person, place, and time  Skin: Skin is warm and dry  Capillary refill takes less than 2 seconds  Psychiatric: He has a normal mood and affect  His behavior is normal  Judgment and thought content normal    Nursing note and vitals reviewed  ED Medications  Medications   ondansetron (ZOFRAN-ODT) dispersible tablet 4 mg (4 mg Oral Given 11/15/17 1159)       Diagnostic Studies  Results Reviewed     Procedure Component Value Units Date/Time    Rapid Beta strep screen [[id number]]  (Normal) Collected:  11/15/17 1229    Lab Status:  Final result Specimen:  Throat from Throat Updated:  11/15/17 1255     Rapid Strep A Screen Negative    Throat culture [[id number]] Collected:  11/15/17 1229    Lab Status:   In process Specimen:  Throat from Throat Updated:  11/15/17 1254    Comprehensive metabolic panel [[id number]] Collected:  11/15/17 1204    Lab Status:  Final result Specimen:  Blood from Arm, Left Updated:  11/15/17 1237     Sodium 138 mmol/L      Potassium 4 1 mmol/L      Chloride 106 mmol/L      CO2 25 mmol/L      Anion Gap 7 mmol/L      BUN 15 mg/dL      Creatinine 0 82 mg/dL      Glucose 80 mg/dL      Calcium 9 0 mg/dL      AST 21 U/L      ALT 47 U/L      Alkaline Phosphatase 77 U/L      Total Protein 7 8 g/dL      Albumin 3 6 g/dL      Total Bilirubin 0 69 mg/dL      eGFR 107 ml/min/1 73sq m     Narrative:         National Kidney Disease Education Program recommendations are as follows:  GFR calculation is accurate only with a steady state creatinine  Chronic Kidney disease less than 60 ml/min/1 73 sq  meters  Kidney failure less than 15 ml/min/1 73 sq  meters  Lipase [[id number]]  (Normal) Collected:  11/15/17 1204    Lab Status:  Final result Specimen:  Blood from Arm, Left Updated:  11/15/17 1237     Lipase 104 u/L     CBC and differential [[id number]]  (Normal) Collected:  11/15/17 1204    Lab Status:  Final result Specimen:  Blood from Arm, Left Updated:  11/15/17 1229     WBC 8 22 Thousand/uL      RBC 4 99 Million/uL      Hemoglobin 15 2 g/dL      Hematocrit 44 7 %      MCV 90 fL      MCH 30 5 pg      MCHC 34 0 g/dL      RDW 12 5 %      MPV 12 0 fL      Platelets 053 Thousands/uL      nRBC 0 /100 WBCs      Neutrophils Relative 71 %      Lymphocytes Relative 18 %      Monocytes Relative 6 %      Eosinophils Relative 4 %      Basophils Relative 1 %      Neutrophils Absolute 5 78 Thousands/µL      Lymphocytes Absolute 1 50 Thousands/µL      Monocytes Absolute 0 53 Thousand/µL      Eosinophils Absolute 0 36 Thousand/µL      Basophils Absolute 0 04 Thousands/µL                  XR chest 2 views   ED Interpretation by [address] [place] [address],  (11/15 1318)   No obvious cardiopulmonary pathology seen on chest x-ray      Final Result by Cathy 6, DO (11/15 1316)      Discoid atelectasis versus scarring lingular segment left lung           Workstation performed: [id number]               Procedures  Procedures      Phone Consults  ED Phone Contact    ED Course  ED Course                                MDM  Number of Diagnoses or Management Options  Diagnosis management comments: 65-year-old male presents emergency department for evaluation of productive cough with scanty amounts of blood  - to the patient's chemotherapy and radiation treatment for his cancer labs will be drawn to ensure there is no electrolyte abnormalities  1    Upper respiratory infection  -  Symptomatic treatment with Nasonex and saline [place]  Crit[facility] Time    Disposition  Final diagnoses:   Upper respiratory infection     Time reflects when diagnosis was documented in both MDM as applicable and the Disposition within this note     Time User Action Codes Description Comment    11/15/2017  1:20 PM Saulo Wright Add [J06 9] Upper respiratory infection       ED Disposition     ED Disposition Condition Comment    Discharge  Eduaremely Smith discharge to home/self care  Condition at discharge: Good        Follow-up Information     Follow up With Specialties Details Why Francisco J Maguire MD Internal Medicine   [id number] [address]  [phone]          Patient's Medications   Discharge Prescriptions    MOMETASONE (NASONEX) 50 MCG/ACT NASAL SPRAY    2 sprays into each nostril daily       Start Date: 11/15/2017End Date: --       Order Dose: 2 sprays       Quantity: 17 g    Refills: 0     No discharge procedures on file  ED Provider  Attending physically available and evaluated Bryson Smith  I managed the patient along with the ED Attending      Electronically Signed by         Teofilo Elena DO  Resident  11/15/17 1938

## 2017-11-17 LAB — BACTERIA THROAT CULT: ABNORMAL

## 2017-11-20 ENCOUNTER — GENERIC CONVERSION - ENCOUNTER (OUTPATIENT)
Dept: OTHER | Facility: OTHER | Age: 47
End: 2017-11-20

## 2017-11-22 NOTE — PROGRESS NOTES
Pt called again and answered  States his throat is better  Denies fever  Will not treat at this time however advised pt to f/u with pcp for recheck of throat  Pt understands and agrees to plan

## 2018-01-12 VITALS
SYSTOLIC BLOOD PRESSURE: 122 MMHG | BODY MASS INDEX: 33.82 KG/M2 | TEMPERATURE: 97.6 F | WEIGHT: 203 LBS | HEIGHT: 65 IN | RESPIRATION RATE: 16 BRPM | HEART RATE: 88 BPM | DIASTOLIC BLOOD PRESSURE: 80 MMHG

## 2018-01-14 VITALS
RESPIRATION RATE: 16 BRPM | BODY MASS INDEX: 33.49 KG/M2 | HEART RATE: 90 BPM | TEMPERATURE: 96.8 F | SYSTOLIC BLOOD PRESSURE: 118 MMHG | OXYGEN SATURATION: 97 % | DIASTOLIC BLOOD PRESSURE: 80 MMHG | WEIGHT: 201 LBS | HEIGHT: 65 IN

## 2018-01-14 VITALS
BODY MASS INDEX: 34.32 KG/M2 | TEMPERATURE: 97.8 F | DIASTOLIC BLOOD PRESSURE: 84 MMHG | HEART RATE: 84 BPM | HEIGHT: 65 IN | SYSTOLIC BLOOD PRESSURE: 136 MMHG | WEIGHT: 206 LBS | RESPIRATION RATE: 15 BRPM

## 2018-01-15 NOTE — MISCELLANEOUS
Provider Comments  Provider Comments:   PT NO SHOWED TODAY      Signatures   Electronically signed by : Nicole Constantino, ; Jul 24 2017 11:50AM EST                       (Author)

## 2018-01-22 VITALS
HEART RATE: 80 BPM | RESPIRATION RATE: 14 BRPM | DIASTOLIC BLOOD PRESSURE: 78 MMHG | BODY MASS INDEX: 30.27 KG/M2 | WEIGHT: 204.38 LBS | SYSTOLIC BLOOD PRESSURE: 120 MMHG | HEIGHT: 69 IN | TEMPERATURE: 96.6 F

## 2018-02-12 DIAGNOSIS — C76.0 MALIGNANT NEOPLASM OF HEAD, FACE AND NECK (HCC): ICD-10-CM

## 2018-05-29 ENCOUNTER — OFFICE VISIT (OUTPATIENT)
Dept: FAMILY MEDICINE CLINIC | Facility: CLINIC | Age: 48
End: 2018-05-29
Payer: MEDICARE

## 2018-05-29 VITALS
HEIGHT: 69 IN | RESPIRATION RATE: 16 BRPM | SYSTOLIC BLOOD PRESSURE: 116 MMHG | BODY MASS INDEX: 29.18 KG/M2 | DIASTOLIC BLOOD PRESSURE: 72 MMHG | HEART RATE: 78 BPM | WEIGHT: 197 LBS | TEMPERATURE: 97.5 F

## 2018-05-29 DIAGNOSIS — C80.1 ADENOID CYSTIC CARCINOMA (HCC): ICD-10-CM

## 2018-05-29 DIAGNOSIS — R53.83 OTHER FATIGUE: Primary | ICD-10-CM

## 2018-05-29 DIAGNOSIS — M25.522 ARTHRALGIA OF LEFT ELBOW: ICD-10-CM

## 2018-05-29 PROCEDURE — 99213 OFFICE O/P EST LOW 20 MIN: CPT | Performed by: FAMILY MEDICINE

## 2018-05-29 RX ORDER — ECHINACEA PURPUREA EXTRACT 125 MG
TABLET ORAL
COMMUNITY

## 2018-05-29 RX ORDER — PREDNISONE 20 MG/1
TABLET ORAL
COMMUNITY
Start: 2016-09-25

## 2018-05-29 NOTE — PROGRESS NOTES
Serg Baron 1970 male MRN: [id number]    Family Medicine Acute Visit    ASSESSMENT/PLAN  Problem List Items Addressed This Visit        Other    Other fatigue - Primary    Relevant Orders    TSH, 3rd generation with T4 reflex    CBC and differential    Comprehensive metabolic panel    Adenoid cystic carcinoma (HCC)    Relevant Orders    Sedimentation rate, automated    RF Screen w/ Reflex to Titer    JEAN-CLAUDE Screen w/ Reflex to Titer/Pattern    Cyclic citrul peptide antibody, IgG    Arthralgia of left elbow          42 yo male with history of adenoid cystic carcinoma s/p radiation, chemo and surgery  Per patient and wife he has been cancer free  Follows with Dr Preeti Sullivan and Dr Jovana Garza respectively  Patient now with fatigue and joint pain which is worsening  Concern for new underlying rheum or autoimmune process vs new/recurrence of possible malignancy given his history  Will start with lab work for fatigue and rheum panel  Advised patient to contact Dr Gopi Benitez office for follow up as they missed last apt  I will also sent message to Dr Preeti Sullivan to help coordinate care for this patient  Follow up with Dr Soy Raymundo, patients PCP following lab work and onc carenal        No future appointments  SUBJECTIVE  CC: Muscle Pain      HPI:  Serg Baron is a 41 y o  male who presents for acute visit  Complains of elbow pain down to wrist on left arm and then right wrist pain  Worse at night time  States pain has been going on for a few months now, thought it would go away but it has not  Sometimes has neck and back pain as well  Never had anything like this before  Tried Tylenol, rest, nothing helped  Worse with lifting things that are heavy  Massage with coconut oil sometimes helps  Personal history of cancer 2 years ago- in [place] had surgery, chemo, radiation treatment  Follows Dr Roberto Lerner and Dr Jovana Garza ENT for follow up and has been cancer free       Review of Systems   Constitutional: Positive for fatigue  Negative for chills, fever and unexpected weight change  Respiratory: Negative for cough, shortness of breath and wheezing  Cardiovascular: Negative for chest pain and palpitations  Gastrointestinal: Negative for abdominal pain, constipation, diarrhea, nausea and vomiting  Endocrine: Positive for polydipsia  Genitourinary: Negative for difficulty urinating and dysuria  Musculoskeletal: Positive for back pain, joint swelling and neck pain  Skin: Negative for color change and rash  Neurological: Positive for numbness  Negative for syncope and headaches  Historical Information   The patient history was reviewed as follows:  Past Medical History:   Diagnosis Date    Cancer ([address] )     inside right nasal passage, sinus and right eye         Past Surgical History:   Procedure Laterality Date    NE COLONOSCOPY FLX DX W/COLLJ SPEC WHEN PFRMD N/A 3/7/2017    Procedure: COLONOSCOPY;  Surgeon: David Hernandez MD;  [facility]; Service: Colorectal    NE NASAL/SINUS ENDOSCOPY,RMV TISS MAXILL SINUS N/A 4/28/2017    Procedure: FUNCTIONAL ENDOSCOPIC SINUS SURGERY-IMAGE GUIDED; Frontal BALLOONoplasty; Anterior ethmoidectomy - right; Endoscopic lysis adhesions; SEPTOPLASTY; Left frontal sinus ballon; Left inferior turbinoplasty;  Surgeon: Ashleigh Rizo MD;  [facility];  Service: ENT    TUMOR EXCISION      Excision from right nasal passage with plastic surgery to apply temporary piece for future nasal surgery       Family History   Problem Relation Age of Onset    Lung cancer Father     Breast cancer Father     Breast cancer Paternal Aunt       Social History   History   Alcohol Use No     Comment: social alcohol use per allscript      History   Drug Use No     History   Smoking Status    Never Smoker   Smokeless Tobacco    Never Used       Medications:     Current Outpatient Prescriptions:     mometasone (NASONEX) 50 mcg/act nasal spray, 2 sprays into each nostril daily, Disp: 17 g, Rfl: 0    predniSONE 20 mg tablet, Take 3 po/day x5 days, take 2/dayx4 days, then take 1po x3 days, then take 1/2 po x 3 days, Disp: , Rfl:     sodium chloride (DEEP SEA NASAL SPRAY) 0 65 % nasal spray, into each nostril, Disp: , Rfl:     Allergies   Allergen Reactions    Clindamycin Hives, Rash and Edema       OBJECTIVE  Vitals:   Vitals:    05/29/18 1116   BP: 116/72   BP Location: Left arm   Patient Position: Sitting   Cuff Size: Large   Pulse: 78   Resp: 16   Temp: 97 5 °F (36 4 °C)   TempSrc: Tympanic   Weight: 89 4 kg (197 lb)   Height: 5' 9 2" (1 758 m)         Physical Exam   Constitutional: He is oriented to person, place, and time  He appears well-developed and well-nourished  HENT:   Head: Normocephalic and atraumatic  Mouth/Throat: Oropharynx is clear and moist    Neck: Normal range of motion  Neck supple  Cardiovascular: Normal rate, regular rhythm and normal heart sounds  No murmur heard  Pulmonary/Chest: Effort normal and breath sounds normal  No respiratory distress  He has no wheezes  Abdominal: Soft  Bowel sounds are normal  He exhibits no distension  There is no tenderness  Musculoskeletal: Normal range of motion  He exhibits no edema  Right wrist: He exhibits tenderness  He exhibits no swelling  Left forearm: He exhibits tenderness  Neurological: He is alert and oriented to person, place, and time  No cranial nerve deficit  Skin: Skin is warm and dry  Psychiatric: He has a normal mood and affect   His behavior is normal                   Kameron Cristobal DO, PGY-3  [facility]   5/29/2018

## 2018-06-07 ENCOUNTER — APPOINTMENT (OUTPATIENT)
Dept: LAB | Facility: HOSPITAL | Age: 48
End: 2018-06-07
Payer: MEDICARE

## 2018-06-07 DIAGNOSIS — C80.1 ADENOID CYSTIC CARCINOMA (HCC): ICD-10-CM

## 2018-06-07 DIAGNOSIS — R53.83 OTHER FATIGUE: ICD-10-CM

## 2018-06-07 LAB
ALBUMIN SERPL BCP-MCNC: 3.7 G/DL (ref 3.5–5)
ALP SERPL-CCNC: 80 U/L (ref 46–116)
ALT SERPL W P-5'-P-CCNC: 46 U/L (ref 12–78)
ANION GAP SERPL CALCULATED.3IONS-SCNC: 7 MMOL/L (ref 4–13)
AST SERPL W P-5'-P-CCNC: 19 U/L (ref 5–45)
BASOPHILS # BLD AUTO: 0.05 THOUSANDS/ΜL (ref 0–0.1)
BASOPHILS NFR BLD AUTO: 1 % (ref 0–1)
BILIRUB SERPL-MCNC: 1.05 MG/DL (ref 0.2–1)
BUN SERPL-MCNC: 17 MG/DL (ref 5–25)
CALCIUM SERPL-MCNC: 8.9 MG/DL (ref 8.3–10.1)
CHLORIDE SERPL-SCNC: 104 MMOL/L (ref 100–108)
CO2 SERPL-SCNC: 27 MMOL/L (ref 21–32)
CREAT SERPL-MCNC: 0.94 MG/DL (ref 0.6–1.3)
EOSINOPHIL # BLD AUTO: 0.15 THOUSAND/ΜL (ref 0–0.61)
EOSINOPHIL NFR BLD AUTO: 3 % (ref 0–6)
ERYTHROCYTE [DISTWIDTH] IN BLOOD BY AUTOMATED COUNT: 12.6 % (ref 11.6–15.1)
ERYTHROCYTE [SEDIMENTATION RATE] IN BLOOD: 2 MM/HOUR (ref 0–10)
GFR SERPL CREATININE-BSD FRML MDRD: 97 ML/MIN/1.73SQ M
GLUCOSE SERPL-MCNC: 88 MG/DL (ref 65–140)
HCT VFR BLD AUTO: 45.7 % (ref 36.5–49.3)
HGB BLD-MCNC: 15 G/DL (ref 12–17)
LYMPHOCYTES # BLD AUTO: 1.46 THOUSANDS/ΜL (ref 0.6–4.47)
LYMPHOCYTES NFR BLD AUTO: 25 % (ref 14–44)
MCH RBC QN AUTO: 30.7 PG (ref 26.8–34.3)
MCHC RBC AUTO-ENTMCNC: 32.8 G/DL (ref 31.4–37.4)
MCV RBC AUTO: 94 FL (ref 82–98)
MONOCYTES # BLD AUTO: 0.34 THOUSAND/ΜL (ref 0.17–1.22)
MONOCYTES NFR BLD AUTO: 6 % (ref 4–12)
NEUTROPHILS # BLD AUTO: 3.9 THOUSANDS/ΜL (ref 1.85–7.62)
NEUTS SEG NFR BLD AUTO: 66 % (ref 43–75)
NRBC BLD AUTO-RTO: 0 /100 WBCS
PLATELET # BLD AUTO: 185 THOUSANDS/UL (ref 149–390)
PMV BLD AUTO: 12.3 FL (ref 8.9–12.7)
POTASSIUM SERPL-SCNC: 3.9 MMOL/L (ref 3.5–5.3)
PROT SERPL-MCNC: 7.9 G/DL (ref 6.4–8.2)
RBC # BLD AUTO: 4.89 MILLION/UL (ref 3.88–5.62)
SODIUM SERPL-SCNC: 138 MMOL/L (ref 136–145)
TSH SERPL DL<=0.05 MIU/L-ACNC: 1.17 UIU/ML (ref 0.36–3.74)
WBC # BLD AUTO: 5.9 THOUSAND/UL (ref 4.31–10.16)

## 2018-06-07 PROCEDURE — 85025 COMPLETE CBC W/AUTO DIFF WBC: CPT

## 2018-06-07 PROCEDURE — 86038 ANTINUCLEAR ANTIBODIES: CPT

## 2018-06-07 PROCEDURE — 86430 RHEUMATOID FACTOR TEST QUAL: CPT

## 2018-06-07 PROCEDURE — 85652 RBC SED RATE AUTOMATED: CPT

## 2018-06-07 PROCEDURE — 84443 ASSAY THYROID STIM HORMONE: CPT

## 2018-06-07 PROCEDURE — 80053 COMPREHEN METABOLIC PANEL: CPT

## 2018-06-07 PROCEDURE — 86200 CCP ANTIBODY: CPT

## 2018-06-07 PROCEDURE — 36415 COLL VENOUS BLD VENIPUNCTURE: CPT

## 2018-06-08 LAB
RHEUMATOID FACT SER QL LA: NEGATIVE
RYE IGE QN: NEGATIVE

## 2018-06-09 LAB — CCP IGA+IGG SERPL IA-ACNC: 10 UNITS (ref 0–19)

## 2018-09-05 ENCOUNTER — OFFICE VISIT (OUTPATIENT)
Dept: FAMILY MEDICINE CLINIC | Facility: CLINIC | Age: 48
End: 2018-09-05
Payer: MEDICARE

## 2018-09-05 VITALS
HEIGHT: 67 IN | SYSTOLIC BLOOD PRESSURE: 124 MMHG | HEART RATE: 76 BPM | DIASTOLIC BLOOD PRESSURE: 70 MMHG | RESPIRATION RATE: 18 BRPM | WEIGHT: 198 LBS | TEMPERATURE: 97 F | BODY MASS INDEX: 31.08 KG/M2

## 2018-09-05 DIAGNOSIS — R53.82 CHRONIC FATIGUE: Primary | ICD-10-CM

## 2018-09-05 DIAGNOSIS — H53.8 BLURRY VISION: ICD-10-CM

## 2018-09-05 PROBLEM — C76.0: Status: ACTIVE | Noted: 2018-05-29

## 2018-09-05 PROBLEM — R63.5 ABNORMAL WEIGHT GAIN: Status: ACTIVE | Noted: 2017-04-24

## 2018-09-05 PROCEDURE — 99213 OFFICE O/P EST LOW 20 MIN: CPT | Performed by: STUDENT IN AN ORGANIZED HEALTH CARE EDUCATION/TRAINING PROGRAM

## 2018-09-05 NOTE — PROGRESS NOTES
Assessment/Plan:     Diagnoses and all orders for this visit:    Chronic fatigue    Blurry vision  -     Ambulatory referral to Ophthalmology; Future          Blood work that was obtained at a prior visit was reviewed with Rivera at today's visit  He was re-assured that he does not have impaired fasting glucose or other metabolic/thyroid/inflammatory abnormalities that might explain his fatigue symptoms  We did talk in length at today's visit regarding making some lifestyle changes such as decreasing caffeine intake, avoiding fluid intake 2-3 hours prior to bed, urinating prior to bed, improving his dietary habits significantly as I am concerned he is not getting consistent restful sleep and/or consuming a healthy diet, both that likely contribute to his fatigue symptoms  I did offer a prostate exam at today's visit to assess for BPH, which he declined  I mentioned that we can do a trial of lifestyle changes, but if not improvement in nocturia, will need to assess prostate  He acknowledged understanding and agreement with the plan  Referral to ophthalmology provided today at his request  Follow up PRN  Subjective:      Patient ID: Geo Hua is a 57 y o  male  HPI    Becki Valderrama is a 57year old male that comes to the office due to concerns of chronic fatigue  He reports that he has been experiencing symptoms of fatigue for quite some time and has even been seen by our office for it in the past  Reports that he is not sleeping well throughout the night and gets up frequently 3-4 times a night to urinate  He admits to drinking many cups of coffee throughout the day and does not avoid drinking fluids 2-3 hours before bed and also does not urinate prior bed  As a result, he feels fatigued throughout the daytime, takes naps during the day and becomes irritable at times, according to his wife  Denies dysuria, snoring at night, fever, chills, back pain   Wife also reports that he eats a lot bread and rice throughout the day  She is worried that he may be diabetic  Aisha Dada is requesting a reprint of a referral to opthalmology that was given to him at a prior visit for blurry vision as it has been > 1yr since he last had his eyes examined by an optometrist of ophthalmologist      The following portions of the patient's history were reviewed and updated as appropriate: allergies, current medications, past family history, past medical history, past social history, past surgical history and problem list     Review of Systems   Constitutional: Positive for fatigue  Negative for chills and fever  HENT: Negative for congestion, ear pain, rhinorrhea and sore throat  Eyes: Positive for visual disturbance (chronic blurry vision)  Respiratory: Negative for cough and shortness of breath  Cardiovascular: Negative for chest pain and palpitations  Gastrointestinal: Negative for abdominal pain, constipation, diarrhea, nausea and vomiting  Endocrine: Negative for polyuria  Genitourinary: Positive for frequency (at night)  Negative for decreased urine volume, dysuria, flank pain, hematuria and urgency  Skin: Negative for rash  Neurological: Negative for headaches  Objective:      /70   Pulse 76   Temp (!) 97 °F (36 1 °C)   Resp 18   Ht 5' 7" (1 702 m)   Wt 89 8 kg (198 lb)   BMI 31 01 kg/m²          Physical Exam   Constitutional: He is oriented to person, place, and time  He appears well-developed and well-nourished  No distress  HENT:   Head: Normocephalic and atraumatic  Right Ear: External ear normal    Left Ear: External ear normal    Eyes: Conjunctivae and EOM are normal  Pupils are equal, round, and reactive to light  Right eye exhibits no discharge  Left eye exhibits no discharge  Neck: Neck supple  Cardiovascular: Normal rate, regular rhythm and normal heart sounds  No murmur heard    Pulmonary/Chest: Effort normal and breath sounds normal  No respiratory distress  He has no wheezes  Abdominal: Soft  Bowel sounds are normal  There is no tenderness  Genitourinary:   Genitourinary Comments: Declined prostate exam at today's visit   Musculoskeletal: Normal range of motion  He exhibits no edema or tenderness  Neurological: He is alert and oriented to person, place, and time  Skin: Skin is warm and dry  He is not diaphoretic  Psychiatric: He has a normal mood and affect

## 2018-10-30 ENCOUNTER — OFFICE VISIT (OUTPATIENT)
Dept: FAMILY MEDICINE CLINIC | Facility: CLINIC | Age: 48
End: 2018-10-30
Payer: COMMERCIAL

## 2018-10-30 VITALS
WEIGHT: 193.8 LBS | DIASTOLIC BLOOD PRESSURE: 80 MMHG | HEART RATE: 78 BPM | SYSTOLIC BLOOD PRESSURE: 130 MMHG | TEMPERATURE: 96.9 F | HEIGHT: 69 IN | RESPIRATION RATE: 16 BRPM | BODY MASS INDEX: 28.71 KG/M2

## 2018-10-30 DIAGNOSIS — G56.01 CARPAL TUNNEL SYNDROME OF RIGHT WRIST: Primary | ICD-10-CM

## 2018-10-30 PROCEDURE — 99213 OFFICE O/P EST LOW 20 MIN: CPT | Performed by: FAMILY MEDICINE

## 2018-10-30 RX ORDER — PHENYLEPHRINE HYDROCHLORIDE 10 MG/1
TABLET, COATED ORAL
Qty: 1 EACH | Refills: 0 | Status: SHIPPED | OUTPATIENT
Start: 2018-10-30

## 2018-10-30 RX ORDER — IBUPROFEN 600 MG/1
600 TABLET ORAL EVERY 8 HOURS PRN
Qty: 42 TABLET | Refills: 0 | Status: SHIPPED | OUTPATIENT
Start: 2018-10-30 | End: 2018-11-22

## 2018-10-30 NOTE — ASSESSMENT & PLAN NOTE
- will give ibuprofen for 2 weeks and wrist splint at night  - can do PT in 2-4 weeks if improvement of symptoms with splint  - follow up as needed

## 2018-10-30 NOTE — PROGRESS NOTES
Assessment/Plan     Carpal tunnel syndrome of right wrist  - will give ibuprofen for 2 weeks and wrist splint at night  - can do PT in 2-4 weeks if improvement of symptoms with splint  - follow up as needed      Diagnoses and all orders for this visit:    Carpal tunnel syndrome of right wrist  -     Elastic Bandages & Supports (CARPAL TUNNEL WRIST STABILIZER) MISC; by Does not apply route daily at bedtime Apply to right hand at bedtime   -     ibuprofen (MOTRIN) 600 mg tablet; Take 1 tablet (600 mg total) by mouth every 8 (eight) hours as needed for mild pain for up to 14 days         Subjective     Chief Complaint: Hand Numbness    HPI:   This is a 56 yo M who presents for ER follow up for numbness  He reports 3 week history of numbness and tingling in digits 2-4 of his right hand  Putting hand in warm water alleviates the issue briefly  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  He is on disability  The following portions of the patient's history were reviewed and updated as appropriate: allergies, current medications, past family history, past medical history, past social history, past surgical history and problem list     Review of Systems  Review of Systems   Constitutional: Negative for fatigue and fever  HENT: Negative for congestion and sore throat  Respiratory: Negative for cough and shortness of breath  Cardiovascular: Negative for chest pain and palpitations  Gastrointestinal: Negative for abdominal pain  Genitourinary: Negative for difficulty urinating  Musculoskeletal:        As noted in HPI    Neurological: Negative for dizziness and headaches  Objective   Vitals:    10/30/18 1013   BP: 130/80   Pulse: 78   Resp: 16   Temp: (!) 96 9 °F (36 1 °C)       Physical Exam   Constitutional: He appears well-developed and well-nourished  HENT:   Mouth/Throat: Oropharynx is clear and moist    Eyes: Conjunctivae are normal    Neck: Neck supple  Musculoskeletal:   Positive tinel's test, negative phalen's, intact strength of interosseous muscles    Vitals reviewed  Lab Results: I have reviewed all the lab results

## 2018-11-15 ENCOUNTER — OFFICE VISIT (OUTPATIENT)
Dept: HEMATOLOGY ONCOLOGY | Facility: CLINIC | Age: 48
End: 2018-11-15
Payer: COMMERCIAL

## 2018-11-15 VITALS
HEART RATE: 67 BPM | TEMPERATURE: 97.8 F | RESPIRATION RATE: 18 BRPM | OXYGEN SATURATION: 95 % | SYSTOLIC BLOOD PRESSURE: 124 MMHG | BODY MASS INDEX: 28.68 KG/M2 | DIASTOLIC BLOOD PRESSURE: 80 MMHG | WEIGHT: 193.6 LBS | HEIGHT: 69 IN

## 2018-11-15 DIAGNOSIS — R04.0 FREQUENT NOSEBLEEDS: ICD-10-CM

## 2018-11-15 DIAGNOSIS — C76.0 ADENOID CYSTIC CARCINOMA OF HEAD AND NECK (HCC): Primary | ICD-10-CM

## 2018-11-15 DIAGNOSIS — M54.50 RIGHT-SIDED LOW BACK PAIN WITHOUT SCIATICA, UNSPECIFIED CHRONICITY: ICD-10-CM

## 2018-11-15 DIAGNOSIS — G56.01 CARPAL TUNNEL SYNDROME OF RIGHT WRIST: ICD-10-CM

## 2018-11-15 PROCEDURE — 99214 OFFICE O/P EST MOD 30 MIN: CPT | Performed by: PHYSICIAN ASSISTANT

## 2018-11-15 NOTE — PROGRESS NOTES
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  Patient was treated in July 2015  Patient was last seen in our office in October 2017  Patient follows with Dr Jose Beth approximately every 3 months  He is seeing him today for routine follow-up  Patient's wife states that at every visit Dr Jose bueno does direct visualization of the nasal cavities  Patient does not have signs or symptoms concerning of recurrent disease  He remain in observation  Repeat labs prior to follow-up  Most recent labs were unremarkable  - CBC and differential  - Comprehensive metabolic panel    2  Carpal tunnel syndrome of right wrist  Patient has symptoms consistent with carpal tunnel syndrome of the right wrist   Positive Tinel sign on physical exam   Patient was advised to continue wearing wrist brace as prescribed by his PCP  Discussed that he should be wearing this at night as well as throughout the day  If this is not beneficial after consistency over few weeks he should follow up with his PCP  He likely would need referral to a hand surgeon  3  Frequent nosebleeds  Frequent nosebleeds have at been since his diagnosis and treatment  He uses saline nasal spray multiple times per day  Advised to continue  Follow-up with ENT  4  Right-sided low back pain without sciatica, unspecified chronicity  Patient has low back pain on the right side  It is tender to palpation  There is no rash present  Discussed that this may be muscular in nature  Advised trial of applying heating pad and stretching  He thinks that they could have injured and possibly mowing the lawn approximately 1 month ago  Patient's wife provided translation during the encounter today      HPI:  74-year-old male presents for follow-up regarding 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  This was diagnosed in July 2015  He had surgical resection with Dr Tu bueno  This was followed by radiation and high-dose cisplatin  Post radiation chemotherapy was not administered  He had good clinical and radiographic response  In March 2016 he underwent debulking surgery  There is no recurrent disease noted  Patient follows with Dr Tu bueno every 3-4 months  Patient is seeing him today  Patient's wife states that he does perform direct visualization with each visit  Patient complains of low were lateral back pain on the right side  This is approximately been present for about a month and a half  He has been taking ibuprofen without relief  He also has chronic blurry vision of the right eye since surgery  He follows with Dr Abner Quick for Ophthalmology  He also has been having numbness and tingling of 3 middle fingers of the right extremity  He was seen by his PCP regarding this  He was advised to take ibuprofen as well as a wrist brace  ROS: Review of Systems   Constitutional: Positive for fatigue  Negative for appetite change, chills, fever and unexpected weight change  HENT: Positive for nosebleeds (multiple times per day, applies saline nasal spray, follws with ENT)  Respiratory: Negative for cough and shortness of breath  Cardiovascular: Negative for chest pain, palpitations and leg swelling  Gastrointestinal: Negative for abdominal pain, blood in stool, constipation, diarrhea, nausea and vomiting  Genitourinary: Negative for difficulty urinating, dysuria and hematuria  Musculoskeletal: Positive for back pain  Skin: Negative  Neurological: Negative for dizziness, weakness, light-headedness, numbness and headaches  Numbness of 3 middle fingers of right hand      Hematological: Negative  Psychiatric/Behavioral: Negative          Past Medical History:   Diagnosis Date    Cancer [facility]) inside right nasal passage, sinus and right eye       Past Surgical History:   Procedure Laterality Date    [place] COLONOSCOPY FLX DX W/COLLJ SPEC WHEN PFRMD N/A 3/7/2017    Procedure: COLONOSCOPY;  Surgeon: Sundeep Kaufman MD;  [facility]; Service: Colorectal    [place] NASAL/SINUS ENDOSCOPY,RMV TISS MAXILL SINUS N/A 4/28/2017    Procedure: FUNCTIONAL ENDOSCOPIC SINUS SURGERY-IMAGE GUIDED; Frontal BALLOONoplasty; Anterior ethmoidectomy - right; Endoscopic lysis adhesions; SEPTOPLASTY; Left frontal sinus ballon; Left inferior turbinoplasty;  Surgeon: Davi Sheppard MD;  [facility];  Service: ENT    TUMOR EXCISION      Excision from right nasal passage with plastic surgery to apply temporary piece for future nasal surgery  Social History     Social History    Marital status: /Civil Union     Spouse name: N/A    Number of children: N/A    Years of education: N/A     Social History Main Topics    Smoking status: Never Smoker    Smokeless tobacco: Never Used    Alcohol use No      Comment: social alcohol use per allscript     Drug use: No    Sexual activity: Not Asked     Other Topics Concern    None     Social History Narrative    None       Family History   Problem Relation Age of Onset    Lung cancer Father     Breast cancer Father     Breast cancer Paternal Aunt        Allergies   Allergen Reactions    Clindamycin Hives, Rash and Edema         Current Outpatient Prescriptions:     Elastic Bandages & Supports (CARPAL TUNNEL WRIST STABILIZER) MISC, by Does not apply route daily at bedtime Apply to right hand at bedtime  , Disp: 1 each, Rfl: 0    ibuprofen (MOTRIN) 600 mg tablet, Take 1 tablet (600 mg total) by mouth every 8 (eight) hours as needed for mild pain for up to 14 days, Disp: 42 tablet, Rfl: 0    mometasone (NASONEX) 50 mcg/act nasal spray, 2 sprays into each nostril daily, Disp: 17 g, Rfl: 0    predniSONE 20 mg tablet, Take 3 po/day x5 days, take 2/dayx4 days, then take 1po x3 days, then take 1/2 po x 3 days, Disp: , Rfl:     sodium chloride (DEEP SEA NASAL SPRAY) 0 65 % nasal spray, into each nostril, Disp: , Rfl:       Physical Exam:  /80 (BP Location: Left arm, Cuff Size: Standard)   Pulse 67   Temp 97 8 °F (36 6 °C) (Tympanic)   Resp 18   Ht 5' 9 3" (1 76 m)   Wt 87 8 kg (193 lb 9 6 oz)   SpO2 95%   BMI 28 34 kg/m²     Physical Exam   Constitutional: He is oriented to person, place, and time  He appears well-developed and well-nourished  No distress  HENT:   Head: Normocephalic and atraumatic  Eyes: Conjunctivae are normal  No scleral icterus  Neck: Normal range of motion  Neck supple  Cardiovascular: Normal rate, regular rhythm and normal heart sounds  No murmur heard  Pulmonary/Chest: Effort normal and breath sounds normal  No respiratory distress  Abdominal: Soft  There is no tenderness  Musculoskeletal: Normal range of motion  He exhibits no edema or tenderness  Positive Tinel sign on on right wrist   Tenderness to right lateral low back    Lymphadenopathy:     He has no cervical adenopathy  He has no axillary adenopathy  Right: No supraclavicular adenopathy present  Left: No supraclavicular adenopathy present  Neurological: He is alert and oriented to person, place, and time  No cranial nerve deficit  Skin: Skin is warm and dry  Psychiatric: He has a normal mood and affect  Vitals reviewed  Labs:  Lab Results   Component Value Date    WBC 5 90 06/07/2018    HGB 15 0 06/07/2018    HCT 45 7 06/07/2018    MCV 94 06/07/2018     06/07/2018     Lab Results   Component Value Date    K 3 9 06/07/2018     06/07/2018    CO2 27 06/07/2018    BUN 17 06/07/2018    CREATININE 0 94 06/07/2018    CALCIUM 8 9 06/07/2018    AST 19 06/07/2018    ALT 46 06/07/2018    ALKPHOS 80 06/07/2018    EGFR 97 06/07/2018       Patient voiced understanding and agreement in the above discussion   Aware to contact our office with questions/symptoms in the interim

## 2018-11-15 NOTE — LETTER
November 15, 2018     Inga Randy, [address] [place] [address]    Patient: Gita Fontana   YOB: 1970   Date of Visit: 11/15/2018       Dear Dr Kash Alan: Thank you for referring Gita Fontana to me for evaluation  Below are my notes for this consultation  If you have questions, please do not hesitate to call me  I look forward to following your patient along with you  Sincerely,        April Cheung PA-C        CC: No Recipients  April Cheung PA-C  11/15/2018 11:15 AM  Sign at close encounter  Hematology/Oncology Outpatient Follow-up  Gita Fontana 57 y o  male 1970 [id number]    Date:  11/15/2018      Assessment and Plan:  1  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 [facility])  57-year-old male with 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  Patient was treated in July 2015  Patient was last seen in our office in October 2017  Patient follows with Dr Kash Alan approximately every 3 months  He is seeing him today for routine follow-up  Patient's wife states that at every visit Dr Kash Alan a does direct visualization of the nasal cavities  Patient does not have signs or symptoms concerning of recurrent disease  He remain in observation  Repeat labs prior to follow-up  Most recent labs were unremarkable  - CBC and differential  - Comprehensive metabolic panel    2  Carpal tunnel syndrome of right wrist  Patient has symptoms consistent with carpal tunnel syndrome of the right wrist   Positive Tinel sign on physical exam   Patient was advised to continue wearing wrist brace as prescribed by his PCP  Discussed that he should be wearing this at night as well as throughout the day  If this is not beneficial after consistency over few weeks he should follow up with his PCP  He likely would need referral to a hand surgeon  3  Frequent nosebleeds  Frequent nosebleeds have at been since his diagnosis and treatment  He uses saline nasal spray multiple times per day  Advised to continue  Follow-up with ENT  4  Right-sided low back pain without sciatica, unspecified chronicity  Patient has low back pain on the right side  It is tender to palpation  There is no rash present  Discussed that this may be muscular in nature  Advised trial of applying heating pad and stretching  He thinks that they could have injured and possibly mowing the lawn approximately 1 month ago  Patient's wife provided translation during the encounter today  HPI:  42-year-old male presents for follow-up regarding 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  This was diagnosed in July 2015  He had surgical resection with Dr Lizz bueno  This was followed by radiation and high-dose cisplatin  Post radiation chemotherapy was not administered  He had good clinical and radiographic response  In March 2016 he underwent debulking surgery  There is no recurrent disease noted  Patient follows with Dr Lizz bueno every 3-4 months  Patient is seeing him today  Patient's wife states that he does perform direct visualization with each visit  Patient complains of low were lateral back pain on the right side  This is approximately been present for about a month and a half  He has been taking ibuprofen without relief  He also has chronic blurry vision of the right eye since surgery  He follows with Dr Myalin Rollins for Ophthalmology  He also has been having numbness and tingling of 3 middle fingers of the right extremity  He was seen by his PCP regarding this  He was advised to take ibuprofen as well as a wrist brace  ROS: Review of Systems   Constitutional: Positive for fatigue  Negative for appetite change, chills, fever and unexpected weight change  HENT: Positive for nosebleeds (multiple times per day, applies saline nasal spray, follws with ENT)  Respiratory: Negative for cough and shortness of breath      Cardiovascular: Negative for chest pain, palpitations and leg swelling  Gastrointestinal: Negative for abdominal pain, blood in stool, constipation, diarrhea, nausea and vomiting  Genitourinary: Negative for difficulty urinating, dysuria and hematuria  Musculoskeletal: Positive for back pain  Skin: Negative  Neurological: Negative for dizziness, weakness, light-headedness, numbness and headaches  Numbness of 3 middle fingers of right hand      Hematological: Negative  Psychiatric/Behavioral: Negative  Past Medical History:   Diagnosis Date    Cancer [facility])     inside right nasal passage, sinus and right eye       Past Surgical History:   Procedure Laterality Date    [place] COLONOSCOPY FLX DX W/COLLJ SPEC WHEN PFRMD N/A 3/7/2017    Procedure: COLONOSCOPY;  Surgeon: Zay Guidry MD;  [facility]; Service: Colorectal    [place] NASAL/SINUS ENDOSCOPY,RMV TISS MAXILL SINUS N/A 4/28/2017    Procedure: FUNCTIONAL ENDOSCOPIC SINUS SURGERY-IMAGE GUIDED; Frontal BALLOONoplasty; Anterior ethmoidectomy - right; Endoscopic lysis adhesions; SEPTOPLASTY; Left frontal sinus ballon; Left inferior turbinoplasty;  Surgeon: Carmen Person MD;  [facility];  Service: ENT    TUMOR EXCISION      Excision from right nasal passage with plastic surgery to apply temporary piece for future nasal surgery         Social History     Social History    Marital status: /Civil Union     Spouse name: N/A    Number of children: N/A    Years of education: N/A     Social History Main Topics    Smoking status: Never Smoker    Smokeless tobacco: Never Used    Alcohol use No      Comment: social alcohol use per allscript     Drug use: No    Sexual activity: Not Asked     Other Topics Concern    None     Social History Narrative    None       Family History   Problem Relation Age of Onset    Lung cancer Father     Breast cancer Father     Breast cancer Paternal Aunt        Allergies   Allergen Reactions    Clindamycin Hives, Rash and Edema         Current Outpatient Prescriptions:     Elastic Bandages & Supports (CARPAL TUNNEL WRIST STABILIZER) MISC, by Does not apply route daily at bedtime Apply to right hand at bedtime  , Disp: 1 each, Rfl: 0    ibuprofen (MOTRIN) 600 mg tablet, Take 1 tablet (600 mg total) by mouth every 8 (eight) hours as needed for mild pain for up to 14 days, Disp: 42 tablet, Rfl: 0    mometasone (NASONEX) 50 mcg/act nasal spray, 2 sprays into each nostril daily, Disp: 17 g, Rfl: 0    predniSONE 20 mg tablet, Take 3 po/day x5 days, take 2/dayx4 days, then take 1po x3 days, then take 1/2 po x 3 days, Disp: , Rfl:     sodium chloride (DEEP SEA NASAL SPRAY) 0 65 % nasal spray, into each nostril, Disp: , Rfl:       Physical Exam:  /80 (BP Location: Left arm, Cuff Size: Standard)   Pulse 67   Temp 97 8 °F (36 6 °C) (Tympanic)   Resp 18   Ht 5' 9 3" (1 76 m)   Wt 87 8 kg (193 lb 9 6 oz)   SpO2 95%   BMI 28 34 kg/m²      Physical Exam   Constitutional: He is oriented to person, place, and time  He appears well-developed and well-nourished  No distress  HENT:   Head: Normocephalic and atraumatic  Eyes: Conjunctivae are normal  No scleral icterus  Neck: Normal range of motion  Neck supple  Cardiovascular: Normal rate, regular rhythm and normal heart sounds  No murmur heard  Pulmonary/Chest: Effort normal and breath sounds normal  No respiratory distress  Abdominal: Soft  There is no tenderness  Musculoskeletal: Normal range of motion  He exhibits no edema or tenderness  Positive Tinel sign on on right wrist   Tenderness to right lateral low back    Lymphadenopathy:     He has no cervical adenopathy  He has no axillary adenopathy  Right: No supraclavicular adenopathy present  Left: No supraclavicular adenopathy present  Neurological: He is alert and oriented to person, place, and time  No cranial nerve deficit  Skin: Skin is warm and dry  Psychiatric: He has a normal mood and affect  Vitals reviewed  Labs:  Lab Results   Component Value Date    WBC 5 90 06/07/2018    HGB 15 0 06/07/2018    HCT 45 7 06/07/2018    MCV 94 06/07/2018     06/07/2018     Lab Results   Component Value Date    K 3 9 06/07/2018     06/07/2018    CO2 27 06/07/2018    BUN 17 06/07/2018    CREATININE 0 94 06/07/2018    CALCIUM 8 9 06/07/2018    AST 19 06/07/2018    ALT 46 06/07/2018    ALKPHOS 80 06/07/2018    EGFR 97 06/07/2018       Patient voiced understanding and agreement in the above discussion  Aware to contact our office with questions/symptoms in the interim

## 2018-11-22 ENCOUNTER — HOSPITAL ENCOUNTER (EMERGENCY)
Facility: HOSPITAL | Age: 48
Discharge: HOME | End: 2018-11-22
Attending: EMERGENCY MEDICINE
Payer: COMMERCIAL

## 2018-11-22 VITALS
DIASTOLIC BLOOD PRESSURE: 77 MMHG | SYSTOLIC BLOOD PRESSURE: 121 MMHG | HEART RATE: 108 BPM | OXYGEN SATURATION: 96 % | RESPIRATION RATE: 17 BRPM | TEMPERATURE: 98.6 F

## 2018-11-22 DIAGNOSIS — R11.0 NAUSEA: ICD-10-CM

## 2018-11-22 DIAGNOSIS — E86.0 DEHYDRATION: Primary | ICD-10-CM

## 2018-11-22 DIAGNOSIS — R10.9 NONSPECIFIC ABDOMINAL PAIN: ICD-10-CM

## 2018-11-22 LAB
ALBUMIN SERPL BCP-MCNC: 3.8 G/DL (ref 3.5–5)
ALP SERPL-CCNC: 75 U/L (ref 46–116)
ALT SERPL W P-5'-P-CCNC: 39 U/L (ref 12–78)
ANION GAP SERPL CALCULATED.3IONS-SCNC: 12 MMOL/L (ref 4–13)
AST SERPL W P-5'-P-CCNC: 18 U/L (ref 5–45)
BACTERIA UR QL AUTO: ABNORMAL /HPF
BASOPHILS # BLD AUTO: 0.04 THOUSANDS/ΜL (ref 0–0.1)
BASOPHILS NFR BLD AUTO: 1 % (ref 0–1)
BILIRUB SERPL-MCNC: 1.67 MG/DL (ref 0.2–1)
BILIRUB UR QL STRIP: NEGATIVE
BUN SERPL-MCNC: 16 MG/DL (ref 5–25)
CALCIUM SERPL-MCNC: 9.2 MG/DL (ref 8.3–10.1)
CHLORIDE SERPL-SCNC: 99 MMOL/L (ref 100–108)
CLARITY UR: CLEAR
CO2 SERPL-SCNC: 26 MMOL/L (ref 21–32)
COLOR UR: ABNORMAL
CREAT SERPL-MCNC: 1.05 MG/DL (ref 0.6–1.3)
EOSINOPHIL # BLD AUTO: 0.02 THOUSAND/ΜL (ref 0–0.61)
EOSINOPHIL NFR BLD AUTO: 0 % (ref 0–6)
ERYTHROCYTE [DISTWIDTH] IN BLOOD BY AUTOMATED COUNT: 11.8 % (ref 11.6–15.1)
GFR SERPL CREATININE-BSD FRML MDRD: 85 ML/MIN/1.73SQ M
GLUCOSE SERPL-MCNC: 111 MG/DL (ref 65–140)
GLUCOSE UR STRIP-MCNC: NEGATIVE MG/DL
HCT VFR BLD AUTO: 51.6 % (ref 36.5–49.3)
HGB BLD-MCNC: 16.7 G/DL (ref 12–17)
HGB UR QL STRIP.AUTO: NEGATIVE
IMM GRANULOCYTES # BLD AUTO: 0.02 THOUSAND/UL (ref 0–0.2)
IMM GRANULOCYTES NFR BLD AUTO: 0 % (ref 0–2)
KETONES UR STRIP-MCNC: NEGATIVE MG/DL
LEUKOCYTE ESTERASE UR QL STRIP: NEGATIVE
LIPASE SERPL-CCNC: 97 U/L (ref 73–393)
LYMPHOCYTES # BLD AUTO: 1.28 THOUSANDS/ΜL (ref 0.6–4.47)
LYMPHOCYTES NFR BLD AUTO: 18 % (ref 14–44)
MAGNESIUM SERPL-MCNC: 2.1 MG/DL (ref 1.6–2.6)
MCH RBC QN AUTO: 30.1 PG (ref 26.8–34.3)
MCHC RBC AUTO-ENTMCNC: 32.4 G/DL (ref 31.4–37.4)
MCV RBC AUTO: 93 FL (ref 82–98)
MONOCYTES # BLD AUTO: 0.89 THOUSAND/ΜL (ref 0.17–1.22)
MONOCYTES NFR BLD AUTO: 12 % (ref 4–12)
MUCOUS THREADS UR QL AUTO: ABNORMAL
NEUTROPHILS # BLD AUTO: 4.91 THOUSANDS/ΜL (ref 1.85–7.62)
NEUTS SEG NFR BLD AUTO: 69 % (ref 43–75)
NITRITE UR QL STRIP: NEGATIVE
NON-SQ EPI CELLS URNS QL MICRO: ABNORMAL /HPF
NRBC BLD AUTO-RTO: 0 /100 WBCS
PH UR STRIP.AUTO: 5.5 [PH] (ref 4.5–8)
PLATELET # BLD AUTO: 189 THOUSANDS/UL (ref 149–390)
PMV BLD AUTO: 11.5 FL (ref 8.9–12.7)
POTASSIUM SERPL-SCNC: 3.5 MMOL/L (ref 3.5–5.3)
PROT SERPL-MCNC: 8.4 G/DL (ref 6.4–8.2)
PROT UR STRIP-MCNC: ABNORMAL MG/DL
RBC # BLD AUTO: 5.55 MILLION/UL (ref 3.88–5.62)
RBC #/AREA URNS AUTO: ABNORMAL /HPF
SODIUM SERPL-SCNC: 137 MMOL/L (ref 136–145)
SP GR UR STRIP.AUTO: 1.02 (ref 1–1.03)
TROPONIN I SERPL-MCNC: <0.02 NG/ML
UROBILINOGEN UR QL STRIP.AUTO: 1 E.U./DL
WBC # BLD AUTO: 7.16 THOUSAND/UL (ref 4.31–10.16)
WBC #/AREA URNS AUTO: ABNORMAL /HPF

## 2018-11-22 PROCEDURE — 36415 COLL VENOUS BLD VENIPUNCTURE: CPT | Performed by: EMERGENCY MEDICINE

## 2018-11-22 PROCEDURE — 93005 ELECTROCARDIOGRAM TRACING: CPT

## 2018-11-22 PROCEDURE — 84484 ASSAY OF TROPONIN QUANT: CPT | Performed by: EMERGENCY MEDICINE

## 2018-11-22 PROCEDURE — 96374 THER/PROPH/DIAG INJ IV PUSH: CPT

## 2018-11-22 PROCEDURE — 80053 COMPREHEN METABOLIC PANEL: CPT | Performed by: EMERGENCY MEDICINE

## 2018-11-22 PROCEDURE — 96361 HYDRATE IV INFUSION ADD-ON: CPT

## 2018-11-22 PROCEDURE — 99284 EMERGENCY DEPT VISIT MOD MDM: CPT

## 2018-11-22 PROCEDURE — 83690 ASSAY OF LIPASE: CPT | Performed by: EMERGENCY MEDICINE

## 2018-11-22 PROCEDURE — 81001 URINALYSIS AUTO W/SCOPE: CPT

## 2018-11-22 PROCEDURE — 83735 ASSAY OF MAGNESIUM: CPT | Performed by: EMERGENCY MEDICINE

## 2018-11-22 PROCEDURE — 85025 COMPLETE CBC W/AUTO DIFF WBC: CPT | Performed by: EMERGENCY MEDICINE

## 2018-11-22 RX ORDER — DICYCLOMINE HCL 20 MG
20 TABLET ORAL ONCE
Status: COMPLETED | OUTPATIENT
Start: 2018-11-22 | End: 2018-11-22

## 2018-11-22 RX ORDER — ONDANSETRON 2 MG/ML
4 INJECTION INTRAMUSCULAR; INTRAVENOUS ONCE
Status: COMPLETED | OUTPATIENT
Start: 2018-11-22 | End: 2018-11-22

## 2018-11-22 RX ORDER — ONDANSETRON 4 MG/1
4 TABLET, FILM COATED ORAL EVERY 6 HOURS
Qty: 12 TABLET | Refills: 0 | Status: SHIPPED | OUTPATIENT
Start: 2018-11-22

## 2018-11-22 RX ORDER — DICYCLOMINE HCL 20 MG
20 TABLET ORAL 2 TIMES DAILY
Qty: 20 TABLET | Refills: 0 | Status: SHIPPED | OUTPATIENT
Start: 2018-11-22

## 2018-11-22 RX ADMIN — ONDANSETRON 4 MG: 2 INJECTION INTRAMUSCULAR; INTRAVENOUS at 15:00

## 2018-11-22 RX ADMIN — SODIUM CHLORIDE 1000 ML: 0.9 INJECTION, SOLUTION INTRAVENOUS at 14:59

## 2018-11-22 RX ADMIN — DICYCLOMINE HYDROCHLORIDE 20 MG: 20 TABLET ORAL at 15:01

## 2018-11-22 NOTE — DISCHARGE INSTRUCTIONS
Dolor abdominal   LO QUE NECESITA SABER:   El dolor abdominal puede ser sordo, [place], o silvio  Usted puede sentir dolor localizado en freddie maykel área del abdomen o en todo el abdomen  El dolor puede ser causado por freddie afección kobe estreñimiento, sensibilidad o intoxicación alimentaria, infección o freddie obstrucción  Asimismo, el dolor abdominal puede deberse a freddie hernia, apendicitis o Shaheed Salinas  Las enfermedades del hígado, la vesícula o el riñón también pueden causar dolor abdominal  La causa del dolor abdominal puede ser desconocida  INSTRUCCIONES SOBRE EL LEILA HOSPITALARIA:   Regrese a la gino de emergencias si:   · Usted comienza a sentir un dolor en el pecho o dificultad para respirar que antes no sentía  · Usted siente un dolor con pulsaciones en la parte superior del abdomen o en la parte inferior de la espalda que de repente se vuelve meka  · El dolor se localiza en la parte inferior derecha del abdomen y [place] cuando se [place]  · Usted tiene fiebre por encima de los 100 4 °F (38 °C) o escalofríos  · Usted tiene vómitos y no puede retener líquidos ni alimentos en el estómago  · El dolor no mejora o empeora en las próximas 8 a 12 horas  · Usted nota janey en couch vómito o heces, o éstas tienen un aspecto negruzco y alquitranado  · Couch piel o las partes tee de ashwin ojos se vuelven amarillentas  · Si usted es freddie bianca y presenta abundante sangrado vaginal que no es couch menstruación  Pregúntele a couch Glory Rinne vitaminas y minerales son adecuados para usted  · Usted siente dolor en la parte inferior de la espalda  · Usted es Cleola Fairy y tiene dolor en los testículos  · Siente dolor al Donalee Dace  · Usted tiene preguntas o inquietudes acerca de couch condición o cuidado  Acuda en 24 horas a freddie tammy de seguimiento con couch médico o kobe se le indique:  Anote ashwin preguntas para que se acuerde de hacerlas tamika ashwin visitas     Medicamentos:   · Lindsey  ser administrados para calmar sánchez estómago y prevenir los vómitos o para disminuir el dolor  Pregunte cómo se debe karissa los analgésicos de forma rodgers  · Stow ashwin medicamentos kobe se le haya indicado  Consulte con sánchez médico si usted darren que sánchez medicamento no le está ayudando o si presenta efectos secundarios  Infórmele si es alérgico a algún medicamento  Mantenga freddie lista actualizada de los [place], las vitaminas y los productos herbales que lisha  Incluya los siguientes datos de los medicamentos: cantidad, frecuencia y motivo de administración  Traiga con usted la lista o los envases de la píldoras a ashwin citas de seguimiento  Lleve la lista de los medicamentos con usted en steve de freddie emergencia  © 2017 [facility] [employer] Information is for End User's use only and may not be sold, redistributed or otherwise used for commercial purposes  All illustrations and images included in CareNotes® are the copyrighted property of A D A M , Inc  or Meliton Navarrete  Esta información es sólo para uso en educación  Sánchez intención no es darle un consejo médico sobre enfermedades o tratamientos  Colsulte con sánchez Odette Felder farmacéutico antes de seguir cualquier régimen médico para saber si es seguro y efectivo para usted  Deshidratación   LO QUE NECESITA SABER:   La deshidratación es [employer] se produce cuando el cuerpo no tiene suficiente líquido  Usted podría deshidratarse si no frank suficiente agua o pierde demasiado líquido  La pérdida de líquido también podría provocar la pérdida de electrolitos (minerales), kobe el sodio  INSTRUCCIONES SOBRE EL LEILA [facility]OSPITALARIA:   Regrese a la gino de emergencias si:   · Usted sufre freddie convulsión  · Usted está confundido o no puede pensar con claridad  · Usted está muy soñoliento, u otra persona no puede despertarlo  · Usted se siente mareado o se desmaya al ponerse de pie  · Usted no puede orinar      · Usted tiene dificultad para respirar  · Sánchez ritmo cardíaco es acelerado o irregular  · Tiene las jessica o los pies fríos o palidez en la juan  Pregúntele a sánchez Grady Shady vitaminas y minerales son adecuados para usted  · Usted tiene dificultad para karissa líquidos porque tiene vómitos  · Alycia síntomas empeoran  · Usted tiene fiebre  · Usted se siente muy débil o cansado  · Usted tiene preguntas o inquietudes acerca de sánchez condición o cuidado  Acuda a alycia consultas de control con sánchez médico según le indicaron  Anote alycia preguntas para que se acuerde de hacerlas tamika alycia visitas  Prevenga o controle la deshidratación:   · [place] líquidos kobe se le haya indicado  Los líquidos que contienen agua, azúcar y [employer] pueden contribuir a que sánchez organismo retenga líquido y evitar que se deshidrate  Consuma líquido a lo rishbah del día y no solamente cuando tenga sed  Los hombres deben karissa aproximadamente 3 litros de líquido al día (13 vasos de ocho onzas)  Las mujeres deben karissa aproximadamente 2 litros de líquido al día (9 vasos de ocho onzas o 250 ml)  Sonja incluso más líquido si realiza actividades al [employer], si se encuentra al sol tamika un período prolongado de [place] o está practicando BIANCAKROSCAR  · Manténgase fresco   Limite la cantidad de tiempo que pasa al aire amalia tamika las horas mas calurosas del día  Vístase con Lurena June y fresca  · Mantenga un registro de la frecuencia con que orina  Si orina menos de lo usual o sánchez orina es más [place], sonja más líquidos  © 2017 [address] Information is for End User's use only and may not be sold, redistributed or otherwise used for commercial purposes  All illustrations and images included in CareNotes® are the copyrighted property of A D A M , Inc  or Meliton Navarrete  Esta información es sólo para uso en educación  Sánchez intención no es darle un consejo médico sobre enfermedades o tratamientos   Colsulte con Vel KNAPP' , enfermera o farmacéutico antes de seguir cualquier régimen médico para saber si es seguro y efectivo para usted

## 2018-11-22 NOTE — ED PROVIDER NOTES
History  Chief Complaint   Patient presents with    Vomiting     pt reports vomiting x3 days  also reports generalized abd pain described as cramping  pt reports subjective fever, denies diarrhea  History provided by:  Patient   used: No    Medical Problem   Location:  Patient states he is not feeling well, subjective fever, nausea without vomiting, nonspecific crampy abdominal discomfort, no diarrhea, decreased p o  intake  Severity:  Moderate  Onset quality:  Gradual  Duration:  3 days  Timing:  Intermittent  Progression:  Waxing and waning  Chronicity:  New  Relieved by:  Nothing  Worsened by:  Trying to eat or drink  Ineffective treatments:  None tried  Associated symptoms: abdominal pain, fever and nausea    Associated symptoms: no chest pain, no cough, no diarrhea, no headaches, no shortness of breath and no vomiting        Prior to Admission Medications   Prescriptions Last Dose Informant Patient Reported? Taking? Elastic Bandages & Supports (CARPAL TUNNEL WRIST STABILIZER) MISC   No Yes   Sig: by Does not apply route daily at bedtime Apply to right hand at bedtime     ibuprofen (MOTRIN) 600 mg tablet   No Yes   Sig: Take 1 tablet (600 mg total) by mouth every 8 (eight) hours as needed for mild pain for up to 14 days   mometasone (NASONEX) 50 mcg/act nasal spray  Self No Yes   Si sprays into each nostril daily   predniSONE 20 mg tablet  Self Yes Yes   Sig: Take 3 po/day x5 days, take 2/dayx4 days, then take 1po x3 days, then take 1/2 po x 3 days   sodium chloride (DEEP SEA NASAL SPRAY) 0 65 % nasal spray  Self Yes Yes   Sig: into each nostril      Facility-Administered Medications: None       Past Medical History:   Diagnosis Date    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, sinus and right eye       Past Surgical History:   Procedure Laterality Date    DE COLONOSCOPY FLX DX W/COLLJ SPEC WHEN PFRMD N/A 3/7/2017    Procedure: COLONOSCOPY;  Surgeon: Sharda Draper MD; Location: [facility]; Service: Colorectal    ND NASAL/SINUS ENDOSCOPY,RMV TISS MAXILL SINUS N/A 4/28/2017    Procedure: FUNCTIONAL ENDOSCOPIC SINUS SURGERY-IMAGE GUIDED; Frontal BALLOONoplasty; Anterior ethmoidectomy - right; Endoscopic lysis adhesions; SEPTOPLASTY; Left frontal sinus ballon; Left inferior turbinoplasty;  Surgeon: Key Sargent MD;  Location: [facility];  Service: ENT    TUMOR EXCISION      Excision from right nasal passage with plastic surgery to apply temporary piece for future nasal surgery  Family History   Problem Relation Age of Onset    Lung cancer Father     Breast cancer Father     Breast cancer Paternal Aunt      I have reviewed and agree with the history as documented  Social History   Substance Use Topics    Smoking status: Never Smoker    Smokeless tobacco: Never Used    Alcohol use No      Comment: social alcohol use per allscript         Review of Systems   Constitutional: Positive for appetite change and fever  Respiratory: Negative for cough, chest tightness and shortness of breath  Cardiovascular: Negative for chest pain  Gastrointestinal: Positive for abdominal pain and nausea  Negative for diarrhea and vomiting  Genitourinary: Negative for dysuria  Neurological: Negative for weakness, light-headedness and headaches  All other systems reviewed and are negative  Physical Exam  Physical Exam   Constitutional: He appears well-developed and well-nourished  He is cooperative  Non-toxic appearance  He does not have a sickly appearance  He does not appear ill  No distress  HENT:   Head: Normocephalic and atraumatic  Right Ear: Hearing normal  No drainage or swelling  Left Ear: Hearing normal  No drainage or swelling  Mouth/Throat: Mucous membranes are dry  Eyes: Conjunctivae and lids are normal  Right eye exhibits no discharge  Left eye exhibits no discharge  Neck: Trachea normal and normal range of motion  No JVD present     Cardiovascular: Regular rhythm, normal heart sounds, intact distal pulses and normal pulses  Tachycardia present  Exam reveals no gallop and no friction rub  No murmur heard  Pulmonary/Chest: Effort normal and breath sounds normal  No stridor  No respiratory distress  He has no wheezes  He has no rales  Abdominal: Soft  Normal appearance  He exhibits no ascites and no mass  There is no hepatosplenomegaly  There is no tenderness  There is no rebound, no guarding and no CVA tenderness  Musculoskeletal: Normal range of motion  He exhibits no edema  Lymphadenopathy:        Right: No inguinal adenopathy present  Left: No inguinal adenopathy present  Neurological: He is alert  He has normal strength  He exhibits normal muscle tone  Gait normal  GCS eye subscore is 4  GCS verbal subscore is 5  GCS motor subscore is 6  Skin: Skin is warm, dry and intact  No rash noted  He is not diaphoretic  No pallor  Psychiatric: He has a normal mood and affect  His speech is normal  Cognition and memory are normal    Nursing note and vitals reviewed        Vital Signs  ED Triage Vitals [11/22/18 1444]   Temperature Pulse Respirations Blood Pressure SpO2   98 6 °F (37 °C) (!) 108 17 121/77 96 %      Temp Source Heart Rate Source Patient Position - Orthostatic VS BP Location FiO2 (%)   Oral Monitor Sitting Right arm --      Pain Score       7           Vitals:    11/22/18 1444   BP: 121/77   Pulse: (!) 108   Patient Position - Orthostatic VS: Sitting       Visual Acuity      ED Medications  Medications   ondansetron (ZOFRAN) injection 4 mg (4 mg Intravenous Given 11/22/18 1500)   sodium chloride 0 9 % bolus 1,000 mL (0 mL Intravenous Stopped 11/22/18 1544)   dicyclomine (BENTYL) tablet 20 mg (20 mg Oral Given 11/22/18 1501)       Diagnostic Studies  Results Reviewed     Procedure Component Value Units Date/Time    Urine Microscopic [[id number]]  (Abnormal) Collected:  11/22/18 1516    Lab Status:  Final result Specimen:  Urine from Urine, Clean Catch Updated:  11/22/18 1523     RBC, UA None Seen /hpf      WBC, UA 2-4 (A) /hpf      Epithelial Cells Occasional /hpf      Bacteria, UA None Seen /hpf      MUCUS THREADS Innumerable (A)    Troponin I [[id number]]  (Normal) Collected:  11/22/18 1457    Lab Status:  Final result Specimen:  Blood from Arm, Right Updated:  11/22/18 1521     Troponin I <0 02 ng/mL     Comprehensive metabolic panel [[id number]]  (Abnormal) Collected:  11/22/18 1457    Lab Status:  Final result Specimen:  Blood from Arm, Right Updated:  11/22/18 1520     Sodium 137 mmol/L      Potassium 3 5 mmol/L      Chloride 99 (L) mmol/L      CO2 26 mmol/L      ANION GAP 12 mmol/L      BUN 16 mg/dL      Creatinine 1 05 mg/dL      Glucose 111 mg/dL      Calcium 9 2 mg/dL      AST 18 U/L      ALT 39 U/L      Alkaline Phosphatase 75 U/L      Total Protein 8 4 (H) g/dL      Albumin 3 8 g/dL      Total Bilirubin 1 67 (H) mg/dL      eGFR 85 ml/min/1 73sq m     Narrative:         National Kidney Disease Education Program recommendations are as follows:  GFR calculation is accurate only with a steady state creatinine  Chronic Kidney disease less than 60 ml/min/1 73 sq  meters  Kidney failure less than 15 ml/min/1 73 sq  meters      Lipase [[id number]]  (Normal) Collected:  11/22/18 1457    Lab Status:  Final result Specimen:  Blood from Arm, Right Updated:  11/22/18 1520     Lipase 97 u/L     Magnesium [[id number]]  (Normal) Collected:  11/22/18 1457    Lab Status:  Final result Specimen:  Blood from Arm, Right Updated:  11/22/18 1520     Magnesium 2 1 mg/dL     POCT urinalysis dipstick [[id number]]  (Abnormal) Resulted:  11/22/18 1502    Lab Status:  Final result Specimen:  Urine Updated:  11/22/18 1504    CBC and differential [[id number]]  (Abnormal) Collected:  11/22/18 1457    Lab Status:  Final result Specimen:  Blood from Arm, Right Updated:  11/22/18 1504     WBC 7 16 Thousand/uL      RBC 5 55 Million/uL      Hemoglobin 16 7 g/dL Hematocrit 51 6 (H) %      MCV 93 fL      MCH 30 1 pg      MCHC 32 4 g/dL      RDW 11 8 %      MPV 11 5 fL      Platelets 744 Thousands/uL      nRBC 0 /100 WBCs      Neutrophils Relative 69 %      Immat GRANS % 0 %      Lymphocytes Relative 18 %      Monocytes Relative 12 %      Eosinophils Relative 0 %      Basophils Relative 1 %      Neutrophils Absolute 4 91 Thousands/µL      Immature Grans Absolute 0 02 Thousand/uL      Lymphocytes Absolute 1 28 Thousands/µL      Monocytes Absolute 0 89 Thousand/µL      Eosinophils Absolute 0 02 Thousand/µL      Basophils Absolute 0 04 Thousands/µL     ED Urine Macroscopic [[id number]]  (Abnormal) Collected:  11/22/18 1516    Lab Status:  Final result Specimen:  Urine Updated:  11/22/18 1502     Color, UA Hattie     Clarity, UA Clear     pH, UA 5 5     Leukocytes, UA Negative     Nitrite, UA Negative     Protein, UA 30 (1+) (A) mg/dl      Glucose, UA Negative mg/dl      Ketones, UA Negative mg/dl      Urobilinogen, UA 1 0 E U /dl      Bilirubin, UA Negative     Blood, UA Negative     Specific Gravity, UA 1 020    Narrative:       CLIN[facility] RESULT                 No orders to display              Procedures  Procedures       Phone Contacts  ED Phone Contact    ED Course                               MDM  Number of Diagnoses or Management Options  Dehydration:   Nausea:   Nonspecific abdominal pain:   Diagnosis management comments: Laboratory studies fairly unremarkable  Patient somewhat dehydrated with and now feels better after hydration and symptomatic early improved  Clinically appears well         Amount and/or Complexity of Data Reviewed  Clinical lab tests: reviewed and ordered    Patient Progress  Patient progress: stable    CritCare Time    Disposition  Final diagnoses:   Dehydration   Nausea   Nonspecific abdominal pain     Time reflects when diagnosis was documented in both MDM as applicable and the Disposition within this note     Time User Action Codes Description Comment    11/22/2018  3:39 PM Eliazar Ground Add [E86 0] Dehydration     11/22/2018  3:39 PM Eliazar Ground Add [R11 0] Nausea     11/22/2018  3:39 PM Eliazar Ground Add [R10 9] Nonspecific abdominal pain       ED Disposition     ED Disposition Condition Comment    Discharge  Doreen Salas discharge to home/self care  Condition at discharge: Good        Follow-up Information     Follow up With Specialties Details Why Contact Info    Adina Lara MD Family Medicine Schedule an appointment as soon as possible for a visit  [address] [place] [address] [facility] [address] [place] [address]  [phone]            Discharge Medication List as of 11/22/2018  3:41 PM      START taking these medications    Details   dicyclomine (BENTYL) 20 mg tablet Take 1 tablet (20 mg total) by mouth 2 (two) times a day, Starting Thu 11/22/2018, Print      ondansetron (ZOFRAN) 4 mg tablet Take 1 tablet (4 mg total) by mouth every 6 (six) hours, Starting Thu 11/22/2018, Print         CONTINUE these medications which have NOT CHANGED    Details   Elastic Bandages & Supports ([address]) [address] [facility] by Does not apply route daily at bedtime Apply to right hand at bedtime  , Starting Tue 10/30/2018, Print      ibuprofen (MOTRIN) 600 mg tablet Take 1 tablet (600 mg total) by mouth every 8 (eight) hours as needed for mild pain for up to 14 days, Starting Tue 10/30/2018, Until Thu 11/22/2018, Normal      mometasone (NASONEX) 50 mcg/act nasal spray 2 sprays into each nostril daily, Starting Wed 11/15/2017, Print      predniSONE 20 mg tablet Take 3 po/day x5 days, take 2/dayx4 days, then take 1po x3 days, then take 1/2 po x 3 days, Historical Med      sodium chloride (DEEP SEA NASAL SPRAY) 0 65 % nasal spray into each nostril, Historical Med           No discharge procedures on file      ED Provider  Electronically Signed by           Everette Jackson MD  11/22/18 0645

## 2018-11-24 LAB
ATRIAL RATE: 97 BPM
P AXIS: 65 DEGREES
PR INTERVAL: 136 MS
QRS AXIS: 73 DEGREES
QRSD INTERVAL: 86 MS
QT INTERVAL: 324 MS
QTC INTERVAL: 411 MS
T WAVE AXIS: 34 DEGREES
VENTRICULAR RATE: 97 BPM

## 2018-11-24 PROCEDURE — 93010 ELECTROCARDIOGRAM REPORT: CPT | Performed by: INTERNAL MEDICINE

## 2019-06-04 ENCOUNTER — TELEPHONE (OUTPATIENT)
Dept: HEMATOLOGY ONCOLOGY | Facility: CLINIC | Age: 49
End: 2019-06-04

## (undated) DEVICE — NEEDLE 25G X 1 1/2

## (undated) DEVICE — INSTRUMENT TRACKER 9733533XOM ENT 1PK

## (undated) DEVICE — RESOLUTION CLIP 2.8MM X 235CM STR LF DISP

## (undated) DEVICE — NEEDLE SPINAL 22G X 3.5IN  QUINCKE

## (undated) DEVICE — MAYO STAND COVER: Brand: CONVERTORS

## (undated) DEVICE — SINGLE-USE POLYPECTOMY SNARE: Brand: SENSATION SHORT THROW

## (undated) DEVICE — NEURO PATTIES 1/2 X 1 1/2

## (undated) DEVICE — SUT PLAIN 4-0 SC-1 18 IN 1828H

## (undated) DEVICE — WAND COBLATION REFLEX ULTRA 45

## (undated) DEVICE — BLADE 1884004HR TRICUT 5PK M4 4MM ROTATE: Brand: TRICUT

## (undated) DEVICE — SPLINT 1527005 10PK PAIR NASAL STD THICK

## (undated) DEVICE — SUT CHROMIC 4-0 P-3 18 MM 1654G

## (undated) DEVICE — SPECIMEN CONTAINER STERILE PEEL PACK

## (undated) DEVICE — GLOVE SRG BIOGEL ORTHOPEDIC 7

## (undated) DEVICE — 3000CC GUARDIAN II: Brand: GUARDIAN

## (undated) DEVICE — ANTI-FOG SOLUTION WITH FOAM PAD: Brand: DEVON

## (undated) DEVICE — MAGNETIC INSTRUMENT PAD 16" X 20"; LARGE; DISPOSABLE: Brand: CARDINAL HEALTH

## (undated) DEVICE — TUBING 1895522 5PK STRAIGHTSHOT TO XPS: Brand: STRAIGHTSHOT®

## (undated) DEVICE — TUBING SUCTION 5MM X 12 FT

## (undated) DEVICE — Device

## (undated) DEVICE — PATIENT TRACKER 9734887XOM NON-INVASIVE

## (undated) DEVICE — SUT PROLENE 3-0 SH 36 IN 8522H

## (undated) DEVICE — STERILE NASAL PACK: Brand: CARDINAL HEALTH

## (undated) DEVICE — DEVICE SINUS BALLOON INFLATION